# Patient Record
Sex: FEMALE | Race: BLACK OR AFRICAN AMERICAN | Employment: UNEMPLOYED | URBAN - NONMETROPOLITAN AREA
[De-identification: names, ages, dates, MRNs, and addresses within clinical notes are randomized per-mention and may not be internally consistent; named-entity substitution may affect disease eponyms.]

---

## 2018-03-25 ENCOUNTER — HOSPITAL ENCOUNTER (EMERGENCY)
Age: 28
Discharge: HOME | End: 2018-03-26
Attending: EMERGENCY MEDICINE
Payer: MEDICAID

## 2018-03-25 ENCOUNTER — APPOINTMENT (OUTPATIENT)
Dept: GENERAL RADIOLOGY | Age: 28
End: 2018-03-25
Payer: MEDICAID

## 2018-03-25 DIAGNOSIS — H81.10 BENIGN PAROXYSMAL POSITIONAL VERTIGO, UNSPECIFIED LATERALITY: Primary | ICD-10-CM

## 2018-03-25 LAB
ALBUMIN SERPL-MCNC: 4 G/DL (ref 3.5–5.2)
ALP BLD-CCNC: 70 U/L (ref 35–104)
ALT SERPL-CCNC: 8 U/L (ref 5–33)
ANION GAP SERPL CALCULATED.3IONS-SCNC: 16 MMOL/L (ref 7–19)
AST SERPL-CCNC: 11 U/L (ref 5–32)
BASOPHILS ABSOLUTE: 0 K/UL (ref 0–0.2)
BASOPHILS RELATIVE PERCENT: 0.5 % (ref 0–1)
BILIRUB SERPL-MCNC: <0.2 MG/DL (ref 0.2–1.2)
BUN BLDV-MCNC: 11 MG/DL (ref 6–20)
CALCIUM SERPL-MCNC: 8.9 MG/DL (ref 8.6–10)
CHLORIDE BLD-SCNC: 100 MMOL/L (ref 98–111)
CO2: 23 MMOL/L (ref 22–29)
CREAT SERPL-MCNC: 0.7 MG/DL (ref 0.5–0.9)
EOSINOPHILS ABSOLUTE: 0.1 K/UL (ref 0–0.6)
EOSINOPHILS RELATIVE PERCENT: 0.7 % (ref 0–5)
GFR NON-AFRICAN AMERICAN: >60
GLUCOSE BLD-MCNC: 104 MG/DL (ref 74–109)
HCT VFR BLD CALC: 38.8 % (ref 37–47)
HEMOGLOBIN: 13 G/DL (ref 12–16)
LYMPHOCYTES ABSOLUTE: 3.6 K/UL (ref 1.1–4.5)
LYMPHOCYTES RELATIVE PERCENT: 40.9 % (ref 20–40)
MCH RBC QN AUTO: 29.3 PG (ref 27–31)
MCHC RBC AUTO-ENTMCNC: 33.5 G/DL (ref 33–37)
MCV RBC AUTO: 87.6 FL (ref 81–99)
MONOCYTES ABSOLUTE: 0.7 K/UL (ref 0–0.9)
MONOCYTES RELATIVE PERCENT: 8.3 % (ref 0–10)
NEUTROPHILS ABSOLUTE: 4.4 K/UL (ref 1.5–7.5)
NEUTROPHILS RELATIVE PERCENT: 49.3 % (ref 50–65)
PDW BLD-RTO: 12.9 % (ref 11.5–14.5)
PLATELET # BLD: 282 K/UL (ref 130–400)
PMV BLD AUTO: 10.6 FL (ref 9.4–12.3)
POTASSIUM SERPL-SCNC: 3.6 MMOL/L (ref 3.5–5)
RBC # BLD: 4.43 M/UL (ref 4.2–5.4)
SODIUM BLD-SCNC: 139 MMOL/L (ref 136–145)
TOTAL PROTEIN: 7 G/DL (ref 6.6–8.7)
WBC # BLD: 8.9 K/UL (ref 4.8–10.8)

## 2018-03-25 PROCEDURE — 6370000000 HC RX 637 (ALT 250 FOR IP): Performed by: EMERGENCY MEDICINE

## 2018-03-25 PROCEDURE — 99284 EMERGENCY DEPT VISIT MOD MDM: CPT

## 2018-03-25 PROCEDURE — 36415 COLL VENOUS BLD VENIPUNCTURE: CPT

## 2018-03-25 PROCEDURE — 71046 X-RAY EXAM CHEST 2 VIEWS: CPT

## 2018-03-25 PROCEDURE — 85025 COMPLETE CBC W/AUTO DIFF WBC: CPT

## 2018-03-25 PROCEDURE — 93005 ELECTROCARDIOGRAM TRACING: CPT

## 2018-03-25 PROCEDURE — 80053 COMPREHEN METABOLIC PANEL: CPT

## 2018-03-25 RX ORDER — MECLIZINE HCL 12.5 MG/1
25 TABLET ORAL ONCE
Status: COMPLETED | OUTPATIENT
Start: 2018-03-25 | End: 2018-03-25

## 2018-03-25 RX ADMIN — MECLIZINE 25 MG: 12.5 TABLET ORAL at 22:10

## 2018-03-26 VITALS
HEIGHT: 63 IN | DIASTOLIC BLOOD PRESSURE: 79 MMHG | WEIGHT: 126 LBS | BODY MASS INDEX: 22.32 KG/M2 | OXYGEN SATURATION: 98 % | TEMPERATURE: 98.9 F | RESPIRATION RATE: 18 BRPM | HEART RATE: 80 BPM | SYSTOLIC BLOOD PRESSURE: 105 MMHG

## 2018-03-26 PROCEDURE — 99284 EMERGENCY DEPT VISIT MOD MDM: CPT | Performed by: EMERGENCY MEDICINE

## 2018-03-26 RX ORDER — MECLIZINE HYDROCHLORIDE 25 MG/1
25 TABLET ORAL 3 TIMES DAILY PRN
Qty: 15 TABLET | Refills: 0 | Status: SHIPPED | OUTPATIENT
Start: 2018-03-26 | End: 2018-04-05

## 2018-03-26 NOTE — ED PROVIDER NOTES
Smokeless tobacco: Never Used    Alcohol use No    Drug use: No    Sexual activity: Not Asked     Other Topics Concern    None     Social History Narrative    None       SCREENINGS    Beckie Coma Scale  Eye Opening: Spontaneous  Best Verbal Response: Oriented  Best Motor Response: Obeys commands  Beckie Coma Scale Score: 15        PHYSICAL EXAM    (up to 7 for level 4, 8 or more for level 5)     ED Triage Vitals [03/25/18 1942]   BP Temp Temp Source Pulse Resp SpO2 Height Weight   122/87 99 °F (37.2 °C) Temporal 90 18 99 % 5' 3\" (1.6 m) 126 lb (57.2 kg)       Physical Exam   Constitutional: She is oriented to person, place, and time. She appears well-developed and well-nourished. No distress. HENT:   Head: Normocephalic and atraumatic. Mouth/Throat: Oropharynx is clear and moist.   Eyes: Conjunctivae and EOM are normal. Pupils are equal, round, and reactive to light. Neck: Normal range of motion. Neck supple. No JVD present. Cardiovascular: Normal rate, regular rhythm and normal heart sounds. Exam reveals no gallop. No murmur heard. Pulmonary/Chest: Effort normal and breath sounds normal. She has no wheezes. She has no rales. Abdominal: Soft. Bowel sounds are normal. She exhibits no distension. There is no tenderness. There is no rebound and no guarding. Musculoskeletal: Normal range of motion. She exhibits no edema. Neurological: She is alert and oriented to person, place, and time. She has normal strength. She is not disoriented. No cranial nerve deficit or sensory deficit. Coordination and gait normal. GCS eye subscore is 4. GCS verbal subscore is 5. GCS motor subscore is 6. Pt will lose her balance with Romberg test.  Patient also has a positive Anthony-Hallpike without fatigue while lying flat. Skin: Skin is warm and dry. No rash noted. Psychiatric: She has a normal mood and affect.  Her behavior is normal. Judgment and thought content normal.   Nursing note and vitals Progress  Patient progress: stable      Reassessment      CONSULTS:  None    PROCEDURES:  Unless otherwise noted below, none     Procedures    FINAL IMPRESSION      1.  Benign paroxysmal positional vertigo, unspecified laterality          DISPOSITION/PLAN   DISPOSITION Decision To Discharge 03/26/2018 02:01:54 AM      PATIENT REFERRED TO:  Steven Arellano MD  [address] [place] [address] Dr Miguel Angel Flannery [address] [facility] [phone]    In 1 week  If symptoms worsen      DISCHARGE MEDICATIONS:  New Prescriptions    MECLIZINE (ANTIVERT) 25 MG TABLET    Take 1 tablet by mouth 3 times daily as needed for Dizziness          (Please note that portions of this note were completed with a voice recognition program.  Efforts were made to edit the dictations but occasionally words are mis-transcribed.)    Omid Quinones MD (electronically signed)  Attending Emergency Physician          Maria Elena Boogie MD  03/26/18 4738

## 2018-03-29 ENCOUNTER — TELEPHONE (OUTPATIENT)
Dept: OTOLARYNGOLOGY | Age: 28
End: 2018-03-29

## 2018-04-04 LAB
EKG P AXIS: 50 DEGREES
EKG P-R INTERVAL: 152 MS
EKG Q-T INTERVAL: 364 MS
EKG QRS DURATION: 68 MS
EKG QTC CALCULATION (BAZETT): 381 MS
EKG T AXIS: 12 DEGREES

## 2024-12-28 ENCOUNTER — HOSPITAL ENCOUNTER (INPATIENT)
Age: 34
LOS: 1 days | Discharge: ANOTHER HOSPITAL | DRG: 881 | End: 2024-12-28
Attending: PEDIATRICS | Admitting: PSYCHIATRY & NEUROLOGY

## 2024-12-28 ENCOUNTER — HOSPITAL ENCOUNTER (OUTPATIENT)
Age: 34
Setting detail: OBSERVATION
Discharge: HOME | End: 2024-12-29
Attending: FAMILY MEDICINE | Admitting: FAMILY MEDICINE

## 2024-12-28 VITALS
RESPIRATION RATE: 16 BRPM | BODY MASS INDEX: 39.82 KG/M2 | DIASTOLIC BLOOD PRESSURE: 84 MMHG | SYSTOLIC BLOOD PRESSURE: 115 MMHG | TEMPERATURE: 98.2 F | WEIGHT: 184.6 LBS | HEIGHT: 57 IN | OXYGEN SATURATION: 100 % | HEART RATE: 83 BPM

## 2024-12-28 DIAGNOSIS — R45.851 SUICIDAL IDEATION: Primary | ICD-10-CM

## 2024-12-28 PROBLEM — F32.A DEPRESSION WITH SUICIDAL IDEATION: Status: ACTIVE | Noted: 2024-12-28

## 2024-12-28 LAB
ALBUMIN SERPL-MCNC: 4 G/DL (ref 3.5–5.2)
ALP SERPL-CCNC: 69 U/L (ref 35–104)
ALT SERPL-CCNC: 11 U/L (ref 5–33)
AMPHET UR QL SCN: NEGATIVE
ANION GAP SERPL CALCULATED.3IONS-SCNC: 13 MMOL/L (ref 7–19)
ANION GAP SERPL CALCULATED.3IONS-SCNC: 14 MMOL/L (ref 7–19)
ANION GAP SERPL CALCULATED.3IONS-SCNC: 16 MMOL/L (ref 7–19)
AST SERPL-CCNC: 13 U/L (ref 5–32)
BARBITURATES UR QL SCN: NEGATIVE
BASOPHILS # BLD: 0 K/UL (ref 0–0.2)
BASOPHILS NFR BLD: 0.5 % (ref 0–1)
BENZODIAZ UR QL SCN: NEGATIVE
BILIRUB SERPL-MCNC: <0.2 MG/DL (ref 0.2–1.2)
BUN SERPL-MCNC: 11 MG/DL (ref 6–20)
BUN SERPL-MCNC: 7 MG/DL (ref 6–20)
BUN SERPL-MCNC: 8 MG/DL (ref 6–20)
BUPRENORPHINE URINE: NEGATIVE
CALCIUM SERPL-MCNC: 8.2 MG/DL (ref 8.6–10)
CALCIUM SERPL-MCNC: 8.4 MG/DL (ref 8.6–10)
CALCIUM SERPL-MCNC: 8.5 MG/DL (ref 8.6–10)
CANNABINOIDS UR QL SCN: NEGATIVE
CHLORIDE SERPL-SCNC: 100 MMOL/L (ref 98–111)
CHLORIDE SERPL-SCNC: 102 MMOL/L (ref 98–111)
CHLORIDE SERPL-SCNC: 102 MMOL/L (ref 98–111)
CO2 SERPL-SCNC: 24 MMOL/L (ref 22–29)
CO2 SERPL-SCNC: 27 MMOL/L (ref 22–29)
CO2 SERPL-SCNC: 28 MMOL/L (ref 22–29)
COCAINE UR QL SCN: NEGATIVE
CREAT SERPL-MCNC: 0.6 MG/DL (ref 0.5–0.9)
CREAT SERPL-MCNC: 0.7 MG/DL (ref 0.5–0.9)
CREAT SERPL-MCNC: 0.9 MG/DL (ref 0.5–0.9)
DRUG SCREEN COMMENT UR-IMP: NORMAL
EKG P AXIS: 60 DEGREES
EKG P-R INTERVAL: 156 MS
EKG Q-T INTERVAL: 352 MS
EKG QRS DURATION: 70 MS
EKG QTC CALCULATION (BAZETT): 402 MS
EKG T AXIS: 0 DEGREES
EOSINOPHIL # BLD: 0 K/UL (ref 0–0.6)
EOSINOPHIL NFR BLD: 0.4 % (ref 0–5)
ERYTHROCYTE [DISTWIDTH] IN BLOOD BY AUTOMATED COUNT: 13.1 % (ref 11.5–14.5)
ETHANOLAMINE SERPL-MCNC: <10 MG/DL (ref 0–0.08)
FENTANYL SCREEN, URINE: NEGATIVE
GLUCOSE SERPL-MCNC: 101 MG/DL (ref 70–99)
GLUCOSE SERPL-MCNC: 125 MG/DL (ref 70–99)
GLUCOSE SERPL-MCNC: 91 MG/DL (ref 70–99)
HCG SERPL QL: NEGATIVE
HCT VFR BLD AUTO: 34.8 % (ref 37–47)
HGB BLD-MCNC: 11.7 G/DL (ref 12–16)
IMM GRANULOCYTES # BLD: 0 K/UL
LYMPHOCYTES # BLD: 2.9 K/UL (ref 1.1–4.5)
LYMPHOCYTES NFR BLD: 33.5 % (ref 20–40)
MAGNESIUM SERPL-MCNC: 0.7 MG/DL (ref 1.6–2.6)
MAGNESIUM SERPL-MCNC: 0.7 MG/DL (ref 1.6–2.6)
MCH RBC QN AUTO: 28.8 PG (ref 27–31)
MCHC RBC AUTO-ENTMCNC: 33.6 G/DL (ref 33–37)
MCV RBC AUTO: 85.7 FL (ref 81–99)
METHADONE UR QL SCN: NEGATIVE
METHAMPHETAMINE, URINE: NEGATIVE
MONOCYTES # BLD: 0.8 K/UL (ref 0–0.9)
MONOCYTES NFR BLD: 9.9 % (ref 0–10)
NEUTROPHILS # BLD: 4.8 K/UL (ref 1.5–7.5)
NEUTS SEG NFR BLD: 55.6 % (ref 50–65)
OPIATES UR QL SCN: NEGATIVE
OXYCODONE UR QL SCN: NEGATIVE
PCP UR QL SCN: NEGATIVE
PLATELET # BLD AUTO: 312 K/UL (ref 130–400)
PMV BLD AUTO: 10.4 FL (ref 9.4–12.3)
POTASSIUM SERPL-SCNC: 2.9 MMOL/L (ref 3.5–5)
POTASSIUM SERPL-SCNC: 3 MMOL/L (ref 3.5–5)
POTASSIUM SERPL-SCNC: 3.8 MMOL/L (ref 3.5–5)
PROT SERPL-MCNC: 7 G/DL (ref 6.4–8.3)
RBC # BLD AUTO: 4.06 M/UL (ref 4.2–5.4)
SARS-COV-2 RDRP RESP QL NAA+PROBE: NOT DETECTED
SODIUM SERPL-SCNC: 140 MMOL/L (ref 136–145)
SODIUM SERPL-SCNC: 143 MMOL/L (ref 136–145)
SODIUM SERPL-SCNC: 143 MMOL/L (ref 136–145)
TRICYCLIC ANTIDEPRESSANTS, UR: NEGATIVE
WBC # BLD AUTO: 8.5 K/UL (ref 4.8–10.8)

## 2024-12-28 PROCEDURE — 80053 COMPREHEN METABOLIC PANEL: CPT

## 2024-12-28 PROCEDURE — 84703 CHORIONIC GONADOTROPIN ASSAY: CPT

## 2024-12-28 PROCEDURE — 83735 ASSAY OF MAGNESIUM: CPT

## 2024-12-28 PROCEDURE — 82077 ASSAY SPEC XCP UR&BREATH IA: CPT

## 2024-12-28 PROCEDURE — 36415 COLL VENOUS BLD VENIPUNCTURE: CPT

## 2024-12-28 PROCEDURE — 80307 DRUG TEST PRSMV CHEM ANLYZR: CPT

## 2024-12-28 PROCEDURE — 6370000000 HC RX 637 (ALT 250 FOR IP)

## 2024-12-28 PROCEDURE — 96372 THER/PROPH/DIAG INJ SC/IM: CPT

## 2024-12-28 PROCEDURE — 5130000000 HC BRIDGE APPOINTMENT

## 2024-12-28 PROCEDURE — 85025 COMPLETE CBC W/AUTO DIFF WBC: CPT

## 2024-12-28 PROCEDURE — 1200000000 HC SEMI PRIVATE

## 2024-12-28 PROCEDURE — 6370000000 HC RX 637 (ALT 250 FOR IP): Performed by: PEDIATRICS

## 2024-12-28 PROCEDURE — 1240000000 HC EMOTIONAL WELLNESS R&B

## 2024-12-28 PROCEDURE — 87635 SARS-COV-2 COVID-19 AMP PRB: CPT

## 2024-12-28 PROCEDURE — 96365 THER/PROPH/DIAG IV INF INIT: CPT

## 2024-12-28 PROCEDURE — 6360000002 HC RX W HCPCS: Performed by: PEDIATRICS

## 2024-12-28 PROCEDURE — 99223 1ST HOSP IP/OBS HIGH 75: CPT | Performed by: PSYCHIATRY & NEUROLOGY

## 2024-12-28 PROCEDURE — 99284 EMERGENCY DEPT VISIT MOD MDM: CPT

## 2024-12-28 PROCEDURE — 96366 THER/PROPH/DIAG IV INF ADDON: CPT

## 2024-12-28 PROCEDURE — 6360000002 HC RX W HCPCS: Performed by: FAMILY MEDICINE

## 2024-12-28 PROCEDURE — G0480 DRUG TEST DEF 1-7 CLASSES: HCPCS

## 2024-12-28 RX ORDER — NICOTINE 21 MG/24HR
1 PATCH, TRANSDERMAL 24 HOURS TRANSDERMAL DAILY
Status: DISCONTINUED | OUTPATIENT
Start: 2024-12-28 | End: 2024-12-28 | Stop reason: HOSPADM

## 2024-12-28 RX ORDER — HYDROXYZINE HYDROCHLORIDE 25 MG/1
25 TABLET, FILM COATED ORAL 3 TIMES DAILY PRN
Status: DISCONTINUED | OUTPATIENT
Start: 2024-12-28 | End: 2024-12-28 | Stop reason: HOSPADM

## 2024-12-28 RX ORDER — HYDROXYZINE HYDROCHLORIDE 25 MG/1
25 TABLET, FILM COATED ORAL 3 TIMES DAILY PRN
Qty: 90 TABLET | Refills: 0 | Status: SHIPPED | OUTPATIENT
Start: 2024-12-28

## 2024-12-28 RX ORDER — MAGNESIUM SULFATE IN WATER 40 MG/ML
2000 INJECTION, SOLUTION INTRAVENOUS PRN
Status: DISCONTINUED | OUTPATIENT
Start: 2024-12-28 | End: 2024-12-29 | Stop reason: HOSPADM

## 2024-12-28 RX ORDER — LANOLIN ALCOHOL/MO/W.PET/CERES
400 CREAM (GRAM) TOPICAL DAILY
Qty: 30 TABLET | Refills: 0 | Status: SHIPPED | OUTPATIENT
Start: 2024-12-28

## 2024-12-28 RX ORDER — LANOLIN ALCOHOL/MO/W.PET/CERES
400 CREAM (GRAM) TOPICAL DAILY
Status: DISCONTINUED | OUTPATIENT
Start: 2024-12-28 | End: 2024-12-28 | Stop reason: HOSPADM

## 2024-12-28 RX ORDER — MECOBALAMIN 5000 MCG
5 TABLET,DISINTEGRATING ORAL
Status: DISCONTINUED | OUTPATIENT
Start: 2024-12-28 | End: 2024-12-28 | Stop reason: HOSPADM

## 2024-12-28 RX ORDER — MECOBALAMIN 5000 MCG
5 TABLET,DISINTEGRATING ORAL
Qty: 30 TABLET | Refills: 0 | Status: SHIPPED | OUTPATIENT
Start: 2024-12-28

## 2024-12-28 RX ORDER — POLYETHYLENE GLYCOL 3350 17 G
2 POWDER IN PACKET (EA) ORAL
Status: DISCONTINUED | OUTPATIENT
Start: 2024-12-28 | End: 2024-12-28 | Stop reason: HOSPADM

## 2024-12-28 RX ORDER — ACETAMINOPHEN 325 MG/1
650 TABLET ORAL EVERY 4 HOURS PRN
Status: DISCONTINUED | OUTPATIENT
Start: 2024-12-28 | End: 2024-12-28 | Stop reason: HOSPADM

## 2024-12-28 RX ORDER — TRAZODONE HYDROCHLORIDE 50 MG/1
50 TABLET, FILM COATED ORAL NIGHTLY PRN
Qty: 30 TABLET | Refills: 0 | Status: SHIPPED | OUTPATIENT
Start: 2024-12-28

## 2024-12-28 RX ORDER — POLYETHYLENE GLYCOL 3350 17 G/17G
17 POWDER, FOR SOLUTION ORAL DAILY PRN
Status: DISCONTINUED | OUTPATIENT
Start: 2024-12-28 | End: 2024-12-28 | Stop reason: HOSPADM

## 2024-12-28 RX ORDER — LORAZEPAM 2 MG/ML
2 INJECTION INTRAMUSCULAR ONCE
Status: COMPLETED | OUTPATIENT
Start: 2024-12-28 | End: 2024-12-28

## 2024-12-28 RX ORDER — TRAZODONE HYDROCHLORIDE 50 MG/1
50 TABLET, FILM COATED ORAL NIGHTLY PRN
Status: DISCONTINUED | OUTPATIENT
Start: 2024-12-28 | End: 2024-12-28 | Stop reason: HOSPADM

## 2024-12-28 RX ORDER — HALOPERIDOL 5 MG/ML
5 INJECTION INTRAMUSCULAR ONCE
Status: COMPLETED | OUTPATIENT
Start: 2024-12-28 | End: 2024-12-28

## 2024-12-28 RX ORDER — POTASSIUM CHLORIDE 1500 MG/1
40 TABLET, EXTENDED RELEASE ORAL PRN
Status: DISCONTINUED | OUTPATIENT
Start: 2024-12-28 | End: 2024-12-29 | Stop reason: HOSPADM

## 2024-12-28 RX ORDER — POTASSIUM CHLORIDE 7.45 MG/ML
10 INJECTION INTRAVENOUS PRN
Status: DISCONTINUED | OUTPATIENT
Start: 2024-12-28 | End: 2024-12-29 | Stop reason: HOSPADM

## 2024-12-28 RX ADMIN — Medication 400 MG: at 11:50

## 2024-12-28 RX ADMIN — LORAZEPAM 2 MG: 2 INJECTION INTRAMUSCULAR; INTRAVENOUS at 03:47

## 2024-12-28 RX ADMIN — POTASSIUM BICARBONATE 40 MEQ: 782 TABLET, EFFERVESCENT ORAL at 11:50

## 2024-12-28 RX ADMIN — HALOPERIDOL LACTATE 5 MG: 5 INJECTION, SOLUTION INTRAMUSCULAR at 03:47

## 2024-12-28 RX ADMIN — MAGNESIUM SULFATE HEPTAHYDRATE 2000 MG: 40 INJECTION, SOLUTION INTRAVENOUS at 17:08

## 2024-12-28 RX ADMIN — POTASSIUM BICARBONATE 40 MEQ: 782 TABLET, EFFERVESCENT ORAL at 02:31

## 2024-12-28 RX ADMIN — MAGNESIUM SULFATE HEPTAHYDRATE 2000 MG: 40 INJECTION, SOLUTION INTRAVENOUS at 15:38

## 2024-12-28 SDOH — HEALTH STABILITY: PHYSICAL HEALTH: ON AVERAGE, HOW MANY MINUTES DO YOU ENGAGE IN EXERCISE AT THIS LEVEL?: 0 MIN

## 2024-12-28 SDOH — HEALTH STABILITY: PHYSICAL HEALTH: ON AVERAGE, HOW MANY DAYS PER WEEK DO YOU ENGAGE IN MODERATE TO STRENUOUS EXERCISE (LIKE A BRISK WALK)?: 0 DAYS

## 2024-12-28 ASSESSMENT — PATIENT HEALTH QUESTIONNAIRE - PHQ9
SUM OF ALL RESPONSES TO PHQ9 QUESTIONS 1 & 2: 2
SUM OF ALL RESPONSES TO PHQ QUESTIONS 1-9: 2
1. LITTLE INTEREST OR PLEASURE IN DOING THINGS: SEVERAL DAYS
2. FEELING DOWN, DEPRESSED OR HOPELESS: SEVERAL DAYS

## 2024-12-28 ASSESSMENT — PAIN - FUNCTIONAL ASSESSMENT: PAIN_FUNCTIONAL_ASSESSMENT: NONE - DENIES PAIN

## 2024-12-28 ASSESSMENT — SLEEP AND FATIGUE QUESTIONNAIRES
DO YOU HAVE DIFFICULTY SLEEPING: YES
DO YOU USE A SLEEP AID: NO
SLEEP PATTERN: DIFFICULTY FALLING ASLEEP
AVERAGE NUMBER OF SLEEP HOURS: 6

## 2024-12-28 ASSESSMENT — SOCIAL DETERMINANTS OF HEALTH (SDOH)
HOW OFTEN DO YOU ATTEND CHURCH OR RELIGIOUS SERVICES?: NEVER
HOW HARD IS IT FOR YOU TO PAY FOR THE VERY BASICS LIKE FOOD, HOUSING, MEDICAL CARE, AND HEATING?: VERY HARD
HOW OFTEN DO YOU ATTENT MEETINGS OF THE CLUB OR ORGANIZATION YOU BELONG TO?: NEVER
WITHIN THE LAST YEAR, HAVE YOU BEEN KICKED, HIT, SLAPPED, OR OTHERWISE PHYSICALLY HURT BY YOUR PARTNER OR EX-PARTNER?: NO
WITHIN THE LAST YEAR, HAVE YOU BEEN HUMILIATED OR EMOTIONALLY ABUSED IN OTHER WAYS BY YOUR PARTNER OR EX-PARTNER?: NO
WITHIN THE LAST YEAR, HAVE YOU BEEN AFRAID OF YOUR PARTNER OR EX-PARTNER?: NO
IN A TYPICAL WEEK, HOW MANY TIMES DO YOU TALK ON THE PHONE WITH FAMILY, FRIENDS, OR NEIGHBORS?: NEVER
DO YOU BELONG TO ANY CLUBS OR ORGANIZATIONS SUCH AS CHURCH GROUPS UNIONS, FRATERNAL OR ATHLETIC GROUPS, OR SCHOOL GROUPS?: NO
HOW OFTEN DO YOU GET TOGETHER WITH FRIENDS OR RELATIVES?: NEVER
WITHIN THE LAST YEAR, HAVE TO BEEN RAPED OR FORCED TO HAVE ANY KIND OF SEXUAL ACTIVITY BY YOUR PARTNER OR EX-PARTNER?: NO

## 2024-12-28 ASSESSMENT — LIFESTYLE VARIABLES
HOW MANY STANDARD DRINKS CONTAINING ALCOHOL DO YOU HAVE ON A TYPICAL DAY: PATIENT DOES NOT DRINK
HOW OFTEN DO YOU HAVE A DRINK CONTAINING ALCOHOL: NEVER
HOW MANY STANDARD DRINKS CONTAINING ALCOHOL DO YOU HAVE ON A TYPICAL DAY: PATIENT DOES NOT DRINK
HOW OFTEN DO YOU HAVE A DRINK CONTAINING ALCOHOL: NEVER

## 2024-12-28 NOTE — ED TRIAGE NOTES
Pt brought in by EMS. EMS reports that pt sent a message to a friend stating that she was Suicidal. Pts friend contacted EMS for assistance. Pt at this time not discussing situation.

## 2024-12-28 NOTE — PROGRESS NOTES
[facility] ADULT INITIAL INTAKE ASSESSMENT     24     Karen Duque, a 33 yo female presents to the ED for a psychiatric assessment.      ED Arrival time: 30  ED physician: Reji  [facility] Notification time: 220  [facility] Assessment start time: 230  Psychiatrist call time: 300  Spoke with Dr. Gruber     Patient is referred by: ambulance    Reason for visit to ED - Presenting problem:     PT states reason for ED visit, \"I was going through an emotional episode.  I got a lot going on.  They told me that I could come here and get a counselor and do outpatient.  The police told me that.  They told me that I wouldn't be there very long.  I think that is what I needed.  Someone to talk to.  I don't have nobody to talk to now.  The counselor will help me to get my feelings and thoughts out.  They will give me advice to fix it and stop it.  That's what I need.  I am not a medicine person.  Just someone to talk to.  My mom , my daughter , and my granny .  All in the last few years.  I have no support.  That's where a counselor will help me with my problems.  I took that picture of the knife to my throat.  I didn't mean to send it.  I was just having an emotional break.  Sometimes, I take pictures and then delete them.  I don't intend to harm myself.  If I was, it took the ambulance so long to get there, I would already be gone.  I am not going to harm myself.  I am currently going through a divorce and he is staying in a hotel.  I am trying to find a job so I can better myself.\"  Patient denies SI, HI, and AVH at this time.    ED physician reports:  Karen Duque is a 34 y.o. female who presents to the emergency department with suicidal ideation.  Patient refuses to give history at this time.  EMS states that patient took a picture of herself holding a knife to her throat and sent it to a friend.  We are currently awaiting arrival of police as patient was sent to the emergency department on a .  Patient states

## 2024-12-28 NOTE — H&P
[facility]      Hospitalist - History & Physical      PCP: No primary care provider on file.    Date of Admission: 12/28/2024    Date of Service: 12/28/2024    Chief Complaint:  SI    History Of Present Illness:   This patient is a 34 y.o. female presenting to [facility] ED on 12/28 for evaluation of SI, cleared medically in ED and admitted to psychiatry.  Found to have significant hypokalemia and hypomagnesemia.  Plan to transfer to medical floor for electrolyte repletion and telemetry monitoring.    Past Medical History:    History reviewed. No pertinent past medical history.    Past Surgical History:        Procedure Laterality Date    DILATION AND CURETTAGE OF UTERUS         Home Medications:  Prior to Admission medications    Medication Sig Start Date End Date Taking? Authorizing Provider   hydrOXYzine HCl (ATARAX) 25 MG tablet Take 1 tablet by mouth 3 times daily as needed for Anxiety 12/28/24  Yes Jessee Gruber MD   traZODone (DESYREL) 50 MG tablet Take 1 tablet by mouth nightly as needed for Sleep 12/28/24  Yes Jessee Gruber MD   melatonin 5 MG TBDP disintegrating tablet Take 1 tablet by mouth nightly as needed (sleep) 12/28/24  Yes Jessee Gruber MD   magnesium oxide (MAG-OX) 400 (240 Mg) MG tablet Take 1 tablet by mouth daily 12/28/24  Yes Jessee Gruber MD   potassium bicarb-citric acid (EFFER-K) 20 MEQ TBEF effervescent tablet Take 2 tablets by mouth every 6 hours for 2 doses 12/28/24 12/29/24 Yes Jessee Gruber MD       Allergies:    Patient has no known allergies.    Social History:    Tobacco:   reports that she has never smoked. She has never used smokeless tobacco.  Alcohol:   reports no history of alcohol use.  Illicit Drugs: denies    Family History:  History reviewed. No pertinent family history.    Review of Systems     Objective   Physical Examination:  /84   Pulse 83   Temp 98.2 °F (36.8 °C) (Tympanic)   Resp 16   Ht 1.448 m (4' 9\")   Wt 83.7 kg (184 lb 9.6 oz)   LMP  12/21/2024   SpO2 100%   BMI 39.95 kg/m²     Physical Exam     Labs and Diagnostics   CBC:  Recent Labs     12/28/24  0103   WBC 8.5   HGB 11.7*   HCT 34.8*          BMP:  Recent Labs     12/28/24 0103 12/28/24  0918    143   K 2.9* 3.0*    102   CO2 24 27   BUN 11 8   CREATININE 0.9 0.6   CALCIUM 8.5* 8.4*     Recent Labs     12/28/24 0103   AST 13   ALT 11   BILITOT <0.2   ALKPHOS 69       Coag Panel: No results for input(s): \"INR\", \"PROTIME\", \"APTT\" in the last 72 hours.    Cardiac Enzymes: No results for input(s): \"CKTOTAL\", \"TROPONINI\" in the last 72 hours.    ABGs:No results found for: \"PHART\", \"PO2ART\", \"RIG2VIW\"    Urinalysis:  Lab Results   Component Value Date/Time    NITRU NEGATIVE 07/12/2015 05:11 AM    WBCUA 4 07/12/2015 05:11 AM    BACTERIA NEGATIVE 07/12/2015 05:11 AM    RBCUA 0 07/12/2015 05:11 AM    GLUCOSEU NEGATIVE 07/12/2015 05:11 AM       A1C: No results for input(s): \"LABA1C\" in the last 72 hours.    ABG:No results for input(s): \"PHART\", \"BUH4GPM\", \"PO2ART\", \"TZM8LOU\", \"BEART\", \"HGBAE\", \"C3MVHSMZ\", \"CARBOXHGBART\" in the last 72 hours.    Rad: No results found.    Assessment/Plan:   Depression with suicidal ideation  -Per psych    Hypokalemia  Hypomagnesemia  -Replace per protocol  -GI panel due to reported diarrhea    Discharge planning: TBD     Advance Directive: Full Code    Diet: ADULT DIET; Regular     Consults Made:   IP CONSULT TO HOSPITALIST    Further orders per clinical course/attending.     EMR Dragon/Transcription disclaimer:   Much of this encounter note is an electronic transcription/translation of spoken language to printed text. The electronic translation of spoken language may permit erroneous words or phrases to be inadvertently transcribed; although attempts have made to review the note for such errors, some may still exist.

## 2024-12-28 NOTE — ED PROVIDER NOTES
[facility] EMERGENCY DEPT  eMERGENCY dEPARTMENT eNCOUnter      Pt Name: Karen Duque  MRN: [id number]  Birthdate 1990  Date of evaluation: 12/28/2024  Provider: Paula Mendoza MD    CHIEF COMPLAINT       Chief Complaint   Patient presents with    Mental Health Problem         HISTORY OF PRESENT ILLNESS   (Location/Symptom, Timing/Onset,Context/Setting, Quality, Duration, Modifying Factors, Severity)  Note limiting factors.   Karen Duque is a 34 y.o. female who presents to the emergency department with suicidal ideation.  Patient refuses to give history at this time.  EMS states that patient took a picture of herself holding a knife to her throat and sent it to a friend.  We are currently awaiting arrival of police as patient was sent to the emergency department on a 202A.  Patient states \"I have got ago.  Got a cat to take care of.\"  Patient does not answer questions at this time but continues to repeat \"I've got to go.\"  Patient does admit to having a recent divorce but states \"that is okay.\"    HPI    NursingNotes were reviewed.    REVIEW OF SYSTEMS    (2-9 systems for level 4, 10 or more for level 5)     Review of Systems   Unable to perform ROS: Psychiatric disorder (Patient uncooperative at this time)   Psychiatric/Behavioral:  Positive for suicidal ideas (Per EMS/police).             PAST MEDICALHISTORY   History reviewed. No pertinent past medical history.      SURGICAL HISTORY       Past Surgical History:   Procedure Laterality Date    DILATION AND CURETTAGE OF UTERUS           CURRENT MEDICATIONS     Previous Medications    No medications on file       ALLERGIES     Patient has no known allergies.    FAMILY HISTORY     History reviewed. No pertinent family history.       SOCIAL HISTORY       Social History     Socioeconomic History    Marital status: Single     Spouse name: None    Number of children: None    Years of education: None    Highest education level: None   Tobacco Use    Smoking status:  components within normal limits   COMPREHENSIVE METABOLIC PANEL - Abnormal; Notable for the following components:    Potassium 2.9 (*)     Glucose 125 (*)     Calcium 8.5 (*)     All other components within normal limits   COVID-19, RAPID   ETHANOL   DRUG SCRN, BUPRENORPHINE   HCG, SERUM, QUALITATIVE       All other labs were within normal range or not returned as of this dictation.    EMERGENCY DEPARTMENT COURSE and DIFFERENTIAL DIAGNOSIS/MDM:   Vitals:    Vitals:    12/28/24 0030 12/28/24 0032   BP: 116/86 116/86   Pulse: 69    Temp: 99 °F (37.2 °C)    TempSrc: Oral    SpO2: 96% 95%   Weight:  77.1 kg (170 lb)   Height:  1.448 m (4' 9\")       MDM  34-year-old female presents to the emergency department after sending a picture to her friend of patient holding a knife to her own throat.  EMS was called by police department to transfer patient to the emergency department.  Patient is here on a 202 a.  Patient is uncooperative for history and complete physical at this time.  Patient is placed on a 72-hour hold while psychiatric evaluation is completed.  Lab results reviewed.  Patient is medically cleared for psychiatric evaluation and treatment.  [place] psychiatric PA-CHELSEY, evaluates patient at bedside.  Patient is more communicative with her.  Dr. Gruber, psychiatrist, will admit patient to behavioral health for further evaluation and treatment.        PROCEDURES:  Unless otherwise noted below, none     Procedures    FINAL IMPRESSION      1. Suicidal ideation          DISPOSITION/PLAN   DISPOSITION Decision To Admit 12/28/2024 03:25:18 AM   DISPOSITION CONDITION Stable       (Please note that portions of this note were completed with a voice recognition program.  Efforts were made to edit thedictations but occasionally words are mis-transcribed.)    Paula Mendoza MD (electronically signed)  Attending Emergency Physician          Paula Mendoza MD  12/28/24 0325       Paula Mendoza MD  01/01/25

## 2024-12-28 NOTE — PROGRESS NOTES
Behavioral Services  Medicare Certification Upon Admission    I certify that this patient's inpatient psychiatric hospital admission is medically necessary for:    [x] (1) Treatment which could reasonably be expected to improve this patient's condition,       [x] (2) Or for diagnostic study;     AND     [x](2) The inpatient psychiatric services are provided while the individual is under the care of a physician and are included in the individualized plan of care.    Estimated length of stay/service 3-5 days    Plan for post-hospital care TBD    Electronically signed by JORGE CROOKS MD on 12/28/2024 at 9:26 AM

## 2024-12-28 NOTE — H&P
Department of Psychiatry  History and Physical - Adult         CHIEF COMPLAINT: Suicidal ideations    History obtained from:  patient    HISTORY OF PRESENT ILLNESS:          The patient is a 34 y.o. female with no reported previous psychiatric history, who has been admitted to our psychiatric unit for mental health evaluation, due to safety concern, after patient sent her pictures to friend holding a knife to her throat.     For initial psychiatric evaluation, please, refer to the note of psychiatry [facility] nurse  Waleska Sethi RN, as reflected below:  \"PT states reason for ED visit, \"I was going through an emotional episode.  I got a lot going on.  They told me that I could come here and get a counselor and do outpatient.  The police told me that.  They told me that I wouldn't be there very long.  I think that is what I needed.  Someone to talk to.  I don't have nobody to talk to now.  The counselor will help me to get my feelings and thoughts out.  They will give me advice to fix it and stop it.  That's what I need.  I am not a medicine person.  Just someone to talk to.  My mom , my daughter , and my granny .  All in the last few years.  I have no support.  That's where a counselor will help me with my problems.  I took that picture of the knife to my throat.  I didn't mean to send it.  I was just having an emotional break.  Sometimes, I take pictures and then delete them.  I don't intend to harm myself.  If I was, it took the ambulance so long to get there, I would already be gone.  I am not going to harm myself.  I am currently going through a divorce and he is staying in a hotel.  I am trying to find a job so I can better myself.\"  Patient denies SI, HI, and AVH at this time.\"     Patient has been seen in treatment team room with presence of the patient's nurse.  However, patient stated that she does not know why she is in the hospital and requested to be discharged home today, said that she  breath.  Gastrointestinal: No abdominal pain, nausea or vomiting, no diarrhea or constipation.   Musculo-skeletal: No edema, deformities, or loss of functions.  Neurological: No loss of consciousness, abnormal sensations, or weakness.  Skin: No rash, abrasions or bruises.    PHYSICAL EXAM:    Vitals:  BP (!) 116/94   Pulse 99   Temp 98.3 °F (36.8 °C) (Temporal)   Resp 16   Ht 1.448 m (4' 9\")   Wt 83.7 kg (184 lb 9.6 oz)   LMP 12/21/2024   SpO2 99%   BMI 39.95 kg/m²       Mental Status Examination:    Appearance: Poorly groomed and in hospital attire. Made intermittent eye contact.   Behavior: Withdrawn, partially cooperative with interview, mild to moderate psychomotor retardation appreciated.  Gait unremarkable.   Speech: Normal in tone and quality, decrease in volume..   Mood: \"Fine\"   Affect: Mood congruent. Range is flat and restricted  Thought Process: Mostly circumstantial  Thought Content: Patient does not have any current active suicidal and homicidal ideations. No overt delusions or paranoia appreciated.   Perceptions: Seems patient does not have any auditory or visual hallucinations at present time. Patient did not appear to be responding to internal stimuli. No overt psychosis.   Executive Functions: Appear mildly impaired.   Concentration: Decreased  Reasoning: Appears impaired based on interaction from interview   Orientation: to person, place and situation.   Alert.   Language: Intact.   Fund of information: Intact.   Memory: recent and remote appear intact.   Impulsivity: Limited  Neurovegitative: Fair appetite, fair sleep  Insight: Limited  Judgment: Limited    Physical Exam:  GENERAL APPEARANCE: 34 y.o. female  in NAD   HEAD: Normocephalic, atraumatic.   THROAT: No erythema, exudates, lesions. No tongue laceration.   CARDIOVASCULAR: PMI nondisplaced. Regular rhythm and rate. Normal S1 and S2.  PULMONARY: Clear to auscultation bilaterally, no tenderness to palpation.  ABDOMEN: Soft, obese,

## 2024-12-28 NOTE — PROGRESS NOTES
Admission Note      Reason for admission/Target Symptom: Per nursing admission assessment - Reason for Admission: Karen Duque is a 34 y.o. female who presents  with depression.  She denies SI but  she took a picture of herself holding a knife to her throat and sent it to a friend.  Patient was sent to the emergency department by [place] [facility] on a 202A.  Patient states \"I have got ago.  Got a cat to take care of.\"  Patient is adament that she cannot stay.  She was put on an Emergency 72 hour hold in the  ED.  Patient denies SI, HI, and AVH.  She reports an admission to [facility] for psych as a minor but does not go to an outpatient for psychiatric care.  She takes no home medications.  She says that she wants a counselor because she has no one to talk to and if she did have a counselor, it would help her with her depression.  She says that she is going through a divorce and her  is staying in a hotel.  She reports that she is trying to get him to stay in the spare room at the house to save money but her  refused.  She also said that she would not allow him to come to feed her cat while she is here.  She said that she had no one that would do it and her cat will die before she can get home to it.    Diagnoses: Mood Disorder     UDS: Negative   BAL: Negative <10    SW will meet with treatment team to discuss patient's treatment including care planning, discharge planning, and follow-up needs. Patient has been admitted to [facility].     Treatment team will identify the patient's discharge needs. Appointments will be made for medication management and outpatient therapy/counseling. Pt confirmed the need for ongoing treatment post inpatient stay. Pt was also provided a handout of contact information for drug and alcohol treatment centers and other community support service such as [facility], AA, and [facility].

## 2024-12-28 NOTE — PROGRESS NOTES
Group Note    Date: 12/28/24  Start Time: 8:00 AM   End Time:8:30 AM     Number of Participants: 9    Type of Group: Community/Goal     Patient's Goal:      Notes:  Did not participate    Modes of Intervention: Education and Support    Discipline Responsible: Behavioral Health Technician     Signature:  STEPHANIE TIAN

## 2024-12-28 NOTE — DISCHARGE INSTR - LAB
[facility]  Mental Health Resources*    Crisis Resources  988 Suicide and Crisis Hotline  If you or someone you know needs support now, call or text 988 or chat Genius.com.Eye-Q    Suicide Prevention Lifeline  6-512-498-BVTW(0803)    Crisis Text Link  Text [place] to 297503    [facility]  Emergency Crisis Intervention Line   921.776.7265    Mental Health Providers     [facility]   [facility]   [address] [address]   www.childwatchca.org   [phone]   Trauma (Child/Adolescent/Teen)  Patients under 18 years old accepted   Free Service  [facility]   [address] [place] [address] [address]   [phone]   Individual, couple, family counseling, children's counseling, depression, anxiety etc.  Patients under 18 years old accepted   Accepts Medicaid, Medicare, most Commercial Insurances  [facility]   [address] [address]   https://compassIntransa/   [phone]   Patients under 18 years old accepted   Accepts Medicaid, Multiple Commercial   [facility]   [address] [place] [address]   [email or web address]   [phone]   Patients under 18 years old accepted    Accepts Most Commercial Insurances, [place] Medicaid, Medicare  [facility]  [email or web address]   Patients under 18 years old accepted    Accepts Medicaid, Medicare, Most Private Insurances  [facility]  [address]; [place]  [address]  [phone] option 6  [facility] [address] [facility]; [place]  [address]  [phone] option 5  [facility] - Information Age  [address]; [place] [address]  [phone] option 7  [facility]   [address] [place] [address]  [address] [facility]  them from finding success in the workforce.  [address] [place] [address]   [phone]  [email or web address]-[email or web address]     Other Assistance:  Low Income Home Energy Assistance Program (LIHEAP)  Federally-funded program to help eligible, low-income households meet their heating/cooling needs.   Website: https://www.[facility].ky.gov/agencies/dcbs/dfs/pdb/Pages/liheap.aspx  6.589.285.7690    [facility] [employer]  Serving single parent households, foster parents, and teens aging out of the foster care system. Provides clothing, home goods, and furniture to those who are most greatly in need of assistance.  [address]nd [address] [place], [place] [address]  [phone]    [facility]  Assistance with household items and clothing needs.   [address] [place], [place] [address]- at [employer] [employer]   Website: https://[employer]/acts  [phone]

## 2024-12-28 NOTE — PLAN OF CARE
Problem: Risk for Elopement  Goal: Patient will not exit the unit/facility without proper excort  Outcome: Progressing  Flowsheets (Taken 12/28/2024 [id number] by Waleska Sethi RN)  Nursing Interventions for Elopement Risk:   Assist with personal care needs such as toileting, eating, dressing, as needed to reduce the risk of wandering   Collaborate with treatment team for drug withdrawal symptoms treatment   Communicate/escalate to /other team member the risk of elopement   Escort with two staff members if patient must leave the unit   Shoes and clothing collected and placed in gown attire   Make sure patient has all necessary personal care items   Communicate/escalate to nursing supervisor the risk of elopement   Collaborate with treatment team for nicotine replacement   Collaborate with family members/caregivers to mitigate the elopement risk   Communicate/escalate to charge nurse the risk of elopement   Communicate to physician the risk for elopement   Place patient in room far away from exits and stairways   Reduce environmental triggers     Problem: Self Harm/Suicidality  Goal: Will have no self-injury during hospital stay  Description: INTERVENTIONS:  1.  Ensure constant observer at bedside with Q15M safety checks  2.  Maintain a safe environment  3.  Secure patient belongings  4.  Ensure family/visitors adhere to safety recommendations  5.  Ensure safety tray has been added to patient's diet order  6.  Every shift and PRN: Re-assess suicidal risk via Frequent Screener    Outcome: Progressing     Problem: Depression  Goal: Will be euthymic at discharge  Description: INTERVENTIONS:  1. Administer medication as ordered  2. Provide emotional support via 1:1 interaction with staff  3. Encourage involvement in milieu/groups/activities  4. Monitor for social isolation  Outcome: Progressing     Problem: Anxiety  Goal: Will report anxiety at manageable levels  Description: INTERVENTIONS:  1. Administer  medication as ordered  2. Teach and rehearse alternative coping skills  3. Provide emotional support with 1:1 interaction with staff  Outcome: Progressing     Problem: Sleep Disturbance  Goal: Will exhibit normal sleeping pattern  Description: INTERVENTIONS:  1. Administer medication as ordered  2. Decrease environmental stimuli, including noise, as appropriate  3. Discourage social isolation and naps during the day  Outcome: Progressing     Problem: Involuntary Admit  Goal: Will cooperate with staff recommendations and doctor's orders and will demonstrate appropriate behavior  Description: INTERVENTIONS:  1. Treat underlying conditions and offer medication as ordered  2. Educate regarding involuntary admission procedures and rules  3. Contain excessive/inappropriate behavior per unit and hospital policies  Outcome: Progressing

## 2024-12-28 NOTE — PROGRESS NOTES
Nursing Admission Note    Reason for Admission: Karen Duque is a 34 y.o. female who presents  with depression.  She denies SI but  she took a picture of herself holding a knife to her throat and sent it to a friend.  Patient was sent to the emergency department by [employer] on a 202A.  Patient states \"I have got ago.  Got a cat to take care of.\"  Patient is adament that she cannot stay.  She was put on an Emergency 72 hour hold in the  ED.  Patient denies SI, HI, and AVH.  She reports an admission to [facility] for psych as a minor but does not go to an outpatient for psychiatric care.  She takes no home medications.  She says that she wants a counselor because she has no one to talk to and if she did have a counselor, it would help her with her depression.  She says that she is going through a divorce and her  is staying in a hotel.  She reports that she is trying to get him to stay in the spare room at the house to save money but her  refused.  She also said that she would not allow him to come to feed her cat while she is here.  She said that she had no one that would do it and her cat will die before she can get home to it.    Additional Notes:  Patient arrived on the unit via wheelchair accompanied by an ED tech and Security.  Patient was appropriately dressed in paper scrubs.  Patient was calm and cooperative with assessment.  She was medicated in the ED and retired to her room after assessment was completed.    Patient Active Problem List   Diagnosis    Depression with suicidal ideation       C-SSRS:  C-SSRS Completed: yes.    Risk Assessment Completed: yes.    Risk of Suicide per C-SSRS: Risk of Suicide: No Risk  Provider Notified of the C-SSRS Screening and   Risk Assessment Findings: yes.    Order for Constant Observer Continued: no.    If no, discontinued due to the following reasons:  Patient is on 15 minute safety checks yes.  Safety Features on the unit: yes.    No previous    Patient signed all legal documents no   Pt verbalizes understanding:NA       Medical:  Order for Medical H&P placed? yes    Was medical provider notified? yes      Electronically signed by Waleska Sethi RN on 12/28/24 at 4:46 AM CST

## 2024-12-28 NOTE — ED NOTES
ED TO INPATIENT SBAR HANDOFF    Patient Name: Karen Duque   : 1990  34 y.o.   Family/Caregiver Present: No  Code Status Order: No Order    C-SSRS: Risk of Suicide: No Risk  Sitter Yes  Restraints:         Situation  Chief Complaint:   Chief Complaint   Patient presents with    Mental Health Problem     Patient Diagnosis: No admission diagnoses are documented for this encounter.     Brief Description of Patient's Condition: Karen Duque is a 34 y.o. female who presents to the emergency department with suicidal ideation.  Patient refuses to give history at this time.  EMS states that patient took a picture of herself holding a knife to her throat and sent it to a friend.  We are currently awaiting arrival of police as patient was sent to the emergency department on a .  Patient states \"I have got ago.  Got a cat to take care of.\"  Patient does not answer questions at this time but continues to repeat \"I've got to go.\"  Patient does admit to having a recent divorce but states \"that is okay.\"      Mental Status: oriented, alert, coherent, logical, thought processes intact, and able to concentrate and follow conversation  Arrived from: home    Imaging:   No orders to display     COVID-19 Results:   Internal Administration   First Dose      Second Dose           Last COVID Lab No results found for: \"SARS-COV-2\"        Abnormal labs:   Abnormal Labs Reviewed   CBC WITH AUTO DIFFERENTIAL - Abnormal; Notable for the following components:       Result Value    RBC 4.06 (*)     Hemoglobin 11.7 (*)     Hematocrit 34.8 (*)     All other components within normal limits   COMPREHENSIVE METABOLIC PANEL - Abnormal; Notable for the following components:    Potassium 2.9 (*)     Glucose 125 (*)     Calcium 8.5 (*)     All other components within normal limits     Background  Allergies: No Known Allergies  Current Medications:   Medications Administered         potassium bicarb-citric acid (EFFER-K) effervescent  tablet 40 mEq Admin Date  12/28/2024 Action  Given Dose  40 mEq Route  Oral Documented By  Edmond Shook RN            History: History reviewed. No pertinent past medical history.    Assessment  Vitals:     Vitals:    12/28/24 0030 12/28/24 0032   BP: 116/86 116/86   Pulse: 69    Temp: 99 °F (37.2 °C)    TempSrc: Oral    SpO2: 96% 95%   Weight:  77.1 kg (170 lb)   Height:  1.448 m (4' 9\")     Predictive Model Details   No score data available for Deterioration Index      NPO? No  O2 Flow Rate: O2 Device: None (Room air)    Cardiac Rhythm:   NIH Score: NIH     Active LDA's:    Pertinent or High Risk Medications/Drips: no   If Yes, please provide details:   Blood Product Administration: no  If Yes, please provide details:   Sepsis Risk Score      Admitted with Sepsis? No    Recommendation  Incomplete orders:   Patient Belongings:   Additional Comments:   If any further questions, please call Sending RN at [phone]    Electronically signed by: Electronically signed by Edmond Shook RN on 12/28/2024 at 3:42 AM

## 2024-12-28 NOTE — DISCHARGE INSTRUCTIONS
[facility]  Mental Health Resources*    Crisis Resources  988 Suicide and Crisis Hotline  If you or someone you know needs support now, call or text 988 or chat [employer]    Suicide Prevention Lifeline  2-076-426-KAYE(2291)    Crisis Text Link  Text [place] to 146132    [facility]  Emergency Crisis Intervention Line   [phone]    Mental Health Providers     [facility]   [address] [address]   www.childwatchca.org   [phone]   Trauma (Child/Adolescent/Teen)  Patients under 18 years old accepted   Free Service  [facility]   [address] [place] [address] [address]   [phone]   Individual, couple, family counseling, children's counseling, depression, anxiety etc.  Patients under 18 years old accepted   Accepts Medicaid, Medicare, most Commercial Insurances  [facility]   [address] [address]   [email or web address]   [phone]   Patients under 18 years old accepted   Accepts Medicaid, Multiple Commercial   [facility]   [address] [place] [address]   [email or web address]   [phone]   Patients under 18 years old accepted    Accepts Most Commercial Insurances, [place] Medicaid, Medicare  [facility]  [email or web address]   Patients under 18 years old accepted    Accepts Medicaid, Medicare, Most Private Insurances  [facility]  [address]; [place]  [address]  [phone] option 6  [facility] [address] [facility]; [place]  [address]  [phone] option 5  [facility] - [facility]  [address]; [place] [address]  [phone] option 7  [facility]   [address] [place] [address]  [address] [facility]

## 2024-12-28 NOTE — PROGRESS NOTES
SW spoke with pt about a safe discharge plan for when she is ready to leave the hospital, and she reports living alone in [place], and said she plans on returning home after discharge. Pt isn't sure if someone will be able to pick her up at the time of her discharge, and may need transportation assistance. Pt denies receiving current outpatient services, but said she prefers to follow up with [facility] in [place]. SW will contact [facility] on Monday and schedule to pt's follow up appointments.

## 2024-12-28 NOTE — PROGRESS NOTES
Transport and security here to transfer pt to 423.  All of pts belongings sent with transport including items locked in safe.

## 2024-12-29 VITALS
RESPIRATION RATE: 16 BRPM | OXYGEN SATURATION: 95 % | DIASTOLIC BLOOD PRESSURE: 65 MMHG | SYSTOLIC BLOOD PRESSURE: 100 MMHG | TEMPERATURE: 97.4 F | HEART RATE: 84 BPM

## 2024-12-29 PROBLEM — E83.42 HYPOMAGNESEMIA: Status: ACTIVE | Noted: 2024-12-29

## 2024-12-29 PROBLEM — E87.6 HYPOKALEMIA: Status: ACTIVE | Noted: 2024-12-29

## 2024-12-29 LAB — MAGNESIUM SERPL-MCNC: 1.5 MG/DL (ref 1.6–2.6)

## 2024-12-29 PROCEDURE — 36415 COLL VENOUS BLD VENIPUNCTURE: CPT

## 2024-12-29 PROCEDURE — 6360000002 HC RX W HCPCS: Performed by: FAMILY MEDICINE

## 2024-12-29 PROCEDURE — G0378 HOSPITAL OBSERVATION PER HR: HCPCS

## 2024-12-29 PROCEDURE — 96366 THER/PROPH/DIAG IV INF ADDON: CPT

## 2024-12-29 PROCEDURE — 83735 ASSAY OF MAGNESIUM: CPT

## 2024-12-29 RX ADMIN — MAGNESIUM SULFATE HEPTAHYDRATE 2000 MG: 40 INJECTION, SOLUTION INTRAVENOUS at 06:28

## 2024-12-29 NOTE — DISCHARGE SUMMARY
Discharge Summary    Patient ID: Karen Duque  MRN: [id number]     Acct:  [id number]       Patient's PCP: No primary care provider on file.    Admit Date: 12/28/2024     Discharge Date:   12/29/2024    Admitting Physician: Yareli Rivera MD    Discharge Physician: James Henson MD     Active Discharge Diagnoses:    Primary Problem  Hypokalemia  Hospital Problems  Active Hospital Problems    Diagnosis Date Noted    Hypokalemia [E87.6] 12/29/2024       The patient was seen and examined on day of discharge and this discharge summary is in conjunction with the daily progress note from day of discharge.    Code Status:  Prior    Hospital Course: Psychiatric patient transferred for electrolyte abnormalities. Magnesium and potassium repleted and she is stable for discharge home. Contacted psychiatry and they have cleared her as she is no longer suicidal. She will need follow-up with her PCP outpatient.  As clear etiology with recent diarrhea and vomiting from GI illness    The patient was admitted for the above noted medical/surgical issues. Please see daily progress note for further details concerning their stay. The patient improved throughout their stay and reached maximum medical improvement on the day of discharge. The patient/family agree with the treatment plan as outlined above. All questions concerning their stay were answered prior to discharge. They understand the importance of follow up concerning any abnormal test results.       Consults:  None    Disposition: Home    Discharged Condition: Stable    Follow Up: PCP 1 week        Diet: ADULT DIET; Regular    Discharge Medications:      Medication List        CONTINUE taking these medications      hydrOXYzine HCl 25 MG tablet  Commonly known as: ATARAX  Take 1 tablet by mouth 3 times daily as needed for Anxiety     magnesium oxide 400 (240 Mg) MG tablet  Commonly known as: MAG-OX  Take 1 tablet by mouth daily     melatonin 5 MG Tbdp disintegrating

## 2024-12-29 NOTE — PROGRESS NOTES
Behavioral Health   Discharge Note  Bridge Appointment completed: Reviewed Discharge Instructions with patient.    Patient verbalizes understanding and agreement with the discharge plan using the teachback method.     Referral for Outpatient Tobacco Cessation Counseling, upon discharge (neeta X if applicable and completed):    ( )  Hospital staff assisted patient to call Quit Line or faxed referral                                   during hospitalization                  ( )  Recognizing danger situations (included triggers and roadblocks), if not completed on admission                    ( )  Coping skills (new ways to manage stress, exercise, relaxation techniques, changing routine, distraction), if not completed on admission                                                           ( )  Basic information about quitting (benefits of quitting, techniques in how to quit, available resources, if not completed on admission  ( ) [facility]   ( ) Patient refused referral  ( ) Patient refused counseling  ( ) Patient refused smoking cessation medication upon discharge  ( ) Patient non-tobacco use    Vaccinations (neeta X if applicable and completed):  ( ) Patient states already received influenza vaccine elsewhere  ( ) Patient received influenza vaccine during this hospitalization  ( ) Patient refused influenza vaccine at this time      Pt discharged with followings belongings:  Dental Appliances: None  Vision - Corrective Lenses: None  Hearing Aid: None  Jewelry: Other (Comment) (see belongings document)  Body Piercings Removed: N/A  Clothing: Other (Comment) (see belongings document)  Other Valuables: Other (Comment) (see belongings document)   Valuables sent with patient to 4th floor  Valuables retrieved from safe and returned to patient.    Patient left department with   security and  transport via wheelchair , discharged to 4th floor room 423  Suicidal Ideations? No Risk

## 2024-12-29 NOTE — H&P
No family history on file.     Review of systems:  Review of Systems   Constitutional:  Negative for chills and fever.   HENT:  Negative for congestion and sore throat.    Respiratory:  Negative for choking and shortness of breath.    Gastrointestinal:  Negative for abdominal distention and anal bleeding.   Genitourinary:  Negative for dysuria and urgency.   Musculoskeletal:  Negative for arthralgias and back pain.   Neurological:  Negative for dizziness and headaches.   Psychiatric/Behavioral:  Negative for agitation and confusion.         A full 14 point review of systems is otherwise negative outside listed above and HPI      Physical Exam:        Physical Exam  Constitutional:       Appearance: Normal appearance.   HENT:      Head: Normocephalic and atraumatic.      Nose: Nose normal.      Mouth/Throat:      Mouth: Mucous membranes are moist.   Eyes:      Extraocular Movements: Extraocular movements intact.      Pupils: Pupils are equal, round, and reactive to light.   Cardiovascular:      Rate and Rhythm: Normal rate and regular rhythm.      Heart sounds: No murmur heard.  Pulmonary:      Effort: Pulmonary effort is normal. No respiratory distress.      Breath sounds: Normal breath sounds.   Abdominal:      General: Abdomen is flat. There is no distension.      Palpations: Abdomen is soft.   Musculoskeletal:         General: Normal range of motion.      Cervical back: Normal range of motion and neck supple.   Skin:     General: Skin is warm.      Capillary Refill: Capillary refill takes less than 2 seconds.   Neurological:      General: No focal deficit present.      Mental Status: She is alert.   Psychiatric:         Mood and Affect: Mood normal.                 CBC:   Recent Labs     12/28/24  0103   WBC 8.5   HGB 11.7*        BMP:    Recent Labs     12/28/24  0103 12/28/24  0918 12/28/24  1357    143 143   K 2.9* 3.0* 3.8    102 102   CO2 24 27 28   BUN 11 8 7   CREATININE 0.9 0.6 0.7

## 2024-12-29 NOTE — DISCHARGE SUMMARY
Discharge Summary     Patient ID:  Karen Duque  [id number]  34 y.o.  1990    Admit date: 2024  Discharge date: 2024    Admitting Physician: Jessee Gruber MD   Attending Physician: No att. providers found  Discharge Provider: JESSEE GRUBER MD     Chief Complaint: suicidal ideations    Admission Diagnoses: Suicidal ideation [R45.851]  Depression with suicidal ideation [F32.A, R45.851]    Discharge Diagnoses: Mood disorder, unspecified  Tobacco use disorder  Relationship issues  Unemployment    Admission Condition: poor    Discharged Condition: fair    Indication for Admission: Safety concern    HPI:   The patient is a 34 y.o. female with no reported previous psychiatric history, who has been admitted to our psychiatric unit for mental health evaluation, due to safety concern, after patient sent her pictures to friend holding a knife to her throat.      For initial psychiatric evaluation, please, refer to the note of psychiatry [facility] nurse  Waleska Sethi RN, as reflected below:  \"PT states reason for ED visit, \"I was going through an emotional episode.  I got a lot going on.  They told me that I could come here and get a counselor and do outpatient.  The police told me that.  They told me that I wouldn't be there very long.  I think that is what I needed.  Someone to talk to.  I don't have nobody to talk to now.  The counselor will help me to get my feelings and thoughts out.  They will give me advice to fix it and stop it.  That's what I need.  I am not a medicine person.  Just someone to talk to.  My mom , my daughter , and my granny .  All in the last few years.  I have no support.  That's where a counselor will help me with my problems.  I took that picture of the knife to my throat.  I didn't mean to send it.  I was just having an emotional break.  Sometimes, I take pictures and then delete them.  I don't intend to harm myself.  If I was, it took the ambulance so long to get

## 2024-12-30 ENCOUNTER — FOLLOWUP TELEPHONE ENCOUNTER (OUTPATIENT)
Dept: PSYCHIATRY | Age: 34
End: 2024-12-30

## 2024-12-31 LAB
EKG P AXIS: 60 DEGREES
EKG P-R INTERVAL: 156 MS
EKG Q-T INTERVAL: 352 MS
EKG QRS DURATION: 70 MS
EKG QTC CALCULATION (BAZETT): 402 MS
EKG T AXIS: 0 DEGREES

## 2025-01-11 ENCOUNTER — HOSPITAL ENCOUNTER (INPATIENT)
Age: 35
LOS: 2 days | Discharge: HOME | DRG: 881 | End: 2025-01-13
Attending: EMERGENCY MEDICINE | Admitting: PSYCHIATRY & NEUROLOGY
Payer: MEDICAID

## 2025-01-11 DIAGNOSIS — F32.A DEPRESSION WITH SUICIDAL IDEATION: Primary | ICD-10-CM

## 2025-01-11 DIAGNOSIS — R45.851 DEPRESSION WITH SUICIDAL IDEATION: Primary | ICD-10-CM

## 2025-01-11 LAB
ALBUMIN SERPL-MCNC: 4.1 G/DL (ref 3.5–5.2)
ALP SERPL-CCNC: 65 U/L (ref 35–104)
ALT SERPL-CCNC: 8 U/L (ref 5–33)
AMPHET UR QL SCN: NEGATIVE
ANION GAP SERPL CALCULATED.3IONS-SCNC: 12 MMOL/L (ref 7–19)
AST SERPL-CCNC: 11 U/L (ref 5–32)
BACTERIA #/AREA URNS HPF: NORMAL /HPF
BARBITURATES UR QL SCN: NEGATIVE
BASOPHILS # BLD: 0.1 K/UL (ref 0–0.2)
BASOPHILS NFR BLD: 0.6 % (ref 0–1)
BENZODIAZ UR QL SCN: POSITIVE
BILIRUB SERPL-MCNC: <0.2 MG/DL (ref 0.2–1.2)
BILIRUB UR QL STRIP: NEGATIVE
BUN SERPL-MCNC: 11 MG/DL (ref 6–20)
BUPRENORPHINE URINE: NEGATIVE
CALCIUM SERPL-MCNC: 8.2 MG/DL (ref 8.6–10)
CANNABINOIDS UR QL SCN: NEGATIVE
CHLORIDE SERPL-SCNC: 101 MMOL/L (ref 98–111)
CLARITY UR: ABNORMAL
CO2 SERPL-SCNC: 24 MMOL/L (ref 22–29)
COCAINE UR QL SCN: POSITIVE
COLOR UR: YELLOW
CREAT SERPL-MCNC: 0.8 MG/DL (ref 0.5–0.9)
DRUG SCREEN COMMENT UR-IMP: ABNORMAL
EOSINOPHIL # BLD: 0 K/UL (ref 0–0.6)
EOSINOPHIL NFR BLD: 0.4 % (ref 0–5)
ERYTHROCYTE [DISTWIDTH] IN BLOOD BY AUTOMATED COUNT: 13.4 % (ref 11.5–14.5)
ETHANOLAMINE SERPL-MCNC: <10 MG/DL (ref 0–0.08)
FENTANYL SCREEN, URINE: NEGATIVE
GLUCOSE SERPL-MCNC: 153 MG/DL (ref 70–99)
GLUCOSE UR STRIP.AUTO-MCNC: NEGATIVE MG/DL
HCG SERPL QL: NEGATIVE
HCT VFR BLD AUTO: 35.9 % (ref 37–47)
HGB BLD-MCNC: 12 G/DL (ref 12–16)
HGB UR STRIP.AUTO-MCNC: NEGATIVE MG/L
IMM GRANULOCYTES # BLD: 0 K/UL
KETONES UR STRIP.AUTO-MCNC: NEGATIVE MG/DL
LEUKOCYTE ESTERASE UR QL STRIP.AUTO: NEGATIVE
LYMPHOCYTES # BLD: 2.6 K/UL (ref 1.1–4.5)
LYMPHOCYTES NFR BLD: 30.2 % (ref 20–40)
MCH RBC QN AUTO: 29.1 PG (ref 27–31)
MCHC RBC AUTO-ENTMCNC: 33.4 G/DL (ref 33–37)
MCV RBC AUTO: 86.9 FL (ref 81–99)
METHADONE UR QL SCN: NEGATIVE
METHAMPHETAMINE, URINE: NEGATIVE
MONOCYTES # BLD: 0.7 K/UL (ref 0–0.9)
MONOCYTES NFR BLD: 7.9 % (ref 0–10)
NEUTROPHILS # BLD: 5.1 K/UL (ref 1.5–7.5)
NEUTS SEG NFR BLD: 60.5 % (ref 50–65)
NITRITE UR QL STRIP.AUTO: NEGATIVE
OPIATES UR QL SCN: NEGATIVE
OXYCODONE UR QL SCN: NEGATIVE
PCP UR QL SCN: NEGATIVE
PH UR STRIP.AUTO: 5.5 [PH] (ref 5–8)
PLATELET # BLD AUTO: 326 K/UL (ref 130–400)
PMV BLD AUTO: 10.9 FL (ref 9.4–12.3)
POTASSIUM SERPL-SCNC: 3.6 MMOL/L (ref 3.5–5)
PROT SERPL-MCNC: 7 G/DL (ref 6.4–8.3)
PROT UR STRIP.AUTO-MCNC: 100 MG/DL
RBC # BLD AUTO: 4.13 M/UL (ref 4.2–5.4)
RBC #/AREA URNS HPF: NORMAL /HPF (ref 0–2)
SARS-COV-2 RDRP RESP QL NAA+PROBE: NOT DETECTED
SODIUM SERPL-SCNC: 137 MMOL/L (ref 136–145)
SP GR UR STRIP.AUTO: 1.01 (ref 1–1.03)
SQUAMOUS #/AREA URNS HPF: NORMAL /HPF
TRICYCLIC ANTIDEPRESSANTS, UR: NEGATIVE
UROBILINOGEN UR STRIP.AUTO-MCNC: 0.2 E.U./DL
WBC # BLD AUTO: 8.4 K/UL (ref 4.8–10.8)
WBC #/AREA URNS HPF: NORMAL /HPF (ref 0–5)

## 2025-01-11 PROCEDURE — 81001 URINALYSIS AUTO W/SCOPE: CPT

## 2025-01-11 PROCEDURE — 83036 HEMOGLOBIN GLYCOSYLATED A1C: CPT

## 2025-01-11 PROCEDURE — 82607 VITAMIN B-12: CPT

## 2025-01-11 PROCEDURE — 6370000000 HC RX 637 (ALT 250 FOR IP): Performed by: PSYCHIATRY & NEUROLOGY

## 2025-01-11 PROCEDURE — 87635 SARS-COV-2 COVID-19 AMP PRB: CPT

## 2025-01-11 PROCEDURE — 82077 ASSAY SPEC XCP UR&BREATH IA: CPT

## 2025-01-11 PROCEDURE — 80053 COMPREHEN METABOLIC PANEL: CPT

## 2025-01-11 PROCEDURE — 84443 ASSAY THYROID STIM HORMONE: CPT

## 2025-01-11 PROCEDURE — 80307 DRUG TEST PRSMV CHEM ANLYZR: CPT

## 2025-01-11 PROCEDURE — 1240000000 HC EMOTIONAL WELLNESS R&B

## 2025-01-11 PROCEDURE — G0480 DRUG TEST DEF 1-7 CLASSES: HCPCS

## 2025-01-11 PROCEDURE — 99285 EMERGENCY DEPT VISIT HI MDM: CPT

## 2025-01-11 PROCEDURE — 84703 CHORIONIC GONADOTROPIN ASSAY: CPT

## 2025-01-11 PROCEDURE — 85025 COMPLETE CBC W/AUTO DIFF WBC: CPT

## 2025-01-11 PROCEDURE — 82306 VITAMIN D 25 HYDROXY: CPT

## 2025-01-11 PROCEDURE — 36415 COLL VENOUS BLD VENIPUNCTURE: CPT

## 2025-01-11 RX ORDER — MECOBALAMIN 5000 MCG
5 TABLET,DISINTEGRATING ORAL
Status: DISCONTINUED | OUTPATIENT
Start: 2025-01-11 | End: 2025-01-13 | Stop reason: HOSPADM

## 2025-01-11 RX ORDER — HYDROXYZINE HYDROCHLORIDE 25 MG/1
25 TABLET, FILM COATED ORAL 3 TIMES DAILY PRN
Status: DISCONTINUED | OUTPATIENT
Start: 2025-01-11 | End: 2025-01-13 | Stop reason: HOSPADM

## 2025-01-11 RX ORDER — TRAZODONE HYDROCHLORIDE 50 MG/1
50 TABLET, FILM COATED ORAL NIGHTLY PRN
Status: DISCONTINUED | OUTPATIENT
Start: 2025-01-11 | End: 2025-01-13 | Stop reason: HOSPADM

## 2025-01-11 RX ORDER — POLYETHYLENE GLYCOL 3350 17 G/17G
17 POWDER, FOR SOLUTION ORAL DAILY PRN
Status: DISCONTINUED | OUTPATIENT
Start: 2025-01-11 | End: 2025-01-13 | Stop reason: HOSPADM

## 2025-01-11 RX ORDER — ACETAMINOPHEN 325 MG/1
650 TABLET ORAL EVERY 4 HOURS PRN
Status: DISCONTINUED | OUTPATIENT
Start: 2025-01-11 | End: 2025-01-13 | Stop reason: HOSPADM

## 2025-01-11 RX ADMIN — TRAZODONE HYDROCHLORIDE 50 MG: 50 TABLET ORAL at 21:44

## 2025-01-11 RX ADMIN — Medication 5 MG: at 21:44

## 2025-01-11 RX ADMIN — ACETAMINOPHEN 650 MG: 325 TABLET ORAL at 21:44

## 2025-01-11 ASSESSMENT — PATIENT HEALTH QUESTIONNAIRE - PHQ9
SUM OF ALL RESPONSES TO PHQ QUESTIONS 1-9: 17
5. POOR APPETITE OR OVEREATING: SEVERAL DAYS
7. TROUBLE CONCENTRATING ON THINGS, SUCH AS READING THE NEWSPAPER OR WATCHING TELEVISION: MORE THAN HALF THE DAYS
4. FEELING TIRED OR HAVING LITTLE ENERGY: MORE THAN HALF THE DAYS
SUM OF ALL RESPONSES TO PHQ QUESTIONS 1-9: 17
SUM OF ALL RESPONSES TO PHQ9 QUESTIONS 1 & 2: 4
10. IF YOU CHECKED OFF ANY PROBLEMS, HOW DIFFICULT HAVE THESE PROBLEMS MADE IT FOR YOU TO DO YOUR WORK, TAKE CARE OF THINGS AT HOME, OR GET ALONG WITH OTHER PEOPLE: VERY DIFFICULT
9. THOUGHTS THAT YOU WOULD BE BETTER OFF DEAD, OR OF HURTING YOURSELF: SEVERAL DAYS
8. MOVING OR SPEAKING SO SLOWLY THAT OTHER PEOPLE COULD HAVE NOTICED. OR THE OPPOSITE, BEING SO FIGETY OR RESTLESS THAT YOU HAVE BEEN MOVING AROUND A LOT MORE THAN USUAL: SEVERAL DAYS
6. FEELING BAD ABOUT YOURSELF - OR THAT YOU ARE A FAILURE OR HAVE LET YOURSELF OR YOUR FAMILY DOWN: NEARLY EVERY DAY
SUM OF ALL RESPONSES TO PHQ QUESTIONS 1-9: 17
2. FEELING DOWN, DEPRESSED OR HOPELESS: MORE THAN HALF THE DAYS
1. LITTLE INTEREST OR PLEASURE IN DOING THINGS: MORE THAN HALF THE DAYS
3. TROUBLE FALLING OR STAYING ASLEEP: NEARLY EVERY DAY
SUM OF ALL RESPONSES TO PHQ QUESTIONS 1-9: 16

## 2025-01-11 ASSESSMENT — SLEEP AND FATIGUE QUESTIONNAIRES
SLEEP PATTERN: DIFFICULTY FALLING ASLEEP;INSOMNIA
DO YOU HAVE DIFFICULTY SLEEPING: YES
AVERAGE NUMBER OF SLEEP HOURS: 4
DO YOU USE A SLEEP AID: NO

## 2025-01-11 ASSESSMENT — PAIN SCALES - GENERAL: PAINLEVEL_OUTOF10: 9

## 2025-01-11 ASSESSMENT — PAIN DESCRIPTION - ORIENTATION: ORIENTATION: RIGHT

## 2025-01-11 ASSESSMENT — PAIN - FUNCTIONAL ASSESSMENT
PAIN_FUNCTIONAL_ASSESSMENT: ACTIVITIES ARE NOT PREVENTED
PAIN_FUNCTIONAL_ASSESSMENT: NONE - DENIES PAIN

## 2025-01-11 ASSESSMENT — LIFESTYLE VARIABLES
HOW MANY STANDARD DRINKS CONTAINING ALCOHOL DO YOU HAVE ON A TYPICAL DAY: 1 OR 2
HOW OFTEN DO YOU HAVE A DRINK CONTAINING ALCOHOL: MONTHLY OR LESS

## 2025-01-11 ASSESSMENT — PAIN DESCRIPTION - DESCRIPTORS: DESCRIPTORS: SORE;TENDER

## 2025-01-11 ASSESSMENT — PAIN DESCRIPTION - LOCATION: LOCATION: ARM

## 2025-01-11 NOTE — ED NOTES
Patient changed in to maroon paper scrubs, belongings sent to security, sitter and PD at bedside.

## 2025-01-11 NOTE — ED PROVIDER NOTES
[facility] EMERGENCY DEPARTMENT  eMERGENCY dEPARTMENT eNCOUnter      Pt Name: Karen Duque  MRN: [id number]  Birthdate 1990  Date of evaluation: 1/11/2025  Provider: Jericho Elizabeth MD    CHIEF COMPLAINT       Chief Complaint   Patient presents with    Mental Health Problem     To ER via PPD,   SI         HISTORY OF PRESENT ILLNESS   (Location/Symptom, Timing/Onset,Context/Setting, Quality, Duration, Modifying Factors, Severity)  Note limiting factors.   Karen Duque is a 34 y.o. female who presents to the emergency department for relation regarding depression and thoughts about wanting to harm herself.  Patient presents here to the ED with [place] police.  Apparently they were called by her ex-boyfriend after patient had made statements via text message that she was going to end her life.  He reported that they have been going through a break-up and he was concerned about her.  Police reported upon their arrival patient would not open the door and she made a statement to them that they would not talk to her alive.  I have reviewed the police report and citation for additional details.  On arrival to the ED patient is alert, speaking and answering questions.  She is calm and cooperative.  She does have a prior history of recent inpatient psychiatric hospitalization.  Denies recent illnesses, fever or chills.    HPI    NursingNotes were reviewed.    REVIEW OF SYSTEMS    (2-9 systems for level 4, 10 or more for level 5)     Review of Systems   Constitutional:  Negative for chills and fever.   Respiratory:  Negative for shortness of breath.    Cardiovascular:  Negative for chest pain.   Gastrointestinal:  Negative for abdominal pain.   Psychiatric/Behavioral:  Positive for suicidal ideas. The patient is nervous/anxious.    All other systems reviewed and are negative.           PAST MEDICALHISTORY   History reviewed. No pertinent past medical history.      SURGICAL HISTORY       Past Surgical History:   Procedure

## 2025-01-12 PROBLEM — F39 UNSPECIFIED MOOD (AFFECTIVE) DISORDER (HCC): Status: ACTIVE | Noted: 2025-01-12

## 2025-01-12 LAB
25(OH)D3 SERPL-MCNC: 7.1 NG/ML
HBA1C MFR BLD: 6 % (ref 4–5.6)
TSH SERPL DL<=0.005 MIU/L-ACNC: 1.06 UIU/ML (ref 0.27–4.2)
VIT B12 SERPL-MCNC: 386 PG/ML (ref 232–1245)

## 2025-01-12 PROCEDURE — 1240000000 HC EMOTIONAL WELLNESS R&B

## 2025-01-12 PROCEDURE — 6370000000 HC RX 637 (ALT 250 FOR IP): Performed by: PSYCHIATRY & NEUROLOGY

## 2025-01-12 PROCEDURE — 99223 1ST HOSP IP/OBS HIGH 75: CPT | Performed by: PSYCHIATRY & NEUROLOGY

## 2025-01-12 RX ADMIN — Medication 5 MG: at 21:13

## 2025-01-12 RX ADMIN — ACETAMINOPHEN 650 MG: 325 TABLET ORAL at 21:13

## 2025-01-12 RX ADMIN — TRAZODONE HYDROCHLORIDE 50 MG: 50 TABLET ORAL at 21:13

## 2025-01-12 ASSESSMENT — PAIN DESCRIPTION - LOCATION: LOCATION: GENERALIZED

## 2025-01-12 ASSESSMENT — PAIN SCALES - GENERAL
PAINLEVEL_OUTOF10: 0
PAINLEVEL_OUTOF10: 0
PAINLEVEL_OUTOF10: 3

## 2025-01-12 ASSESSMENT — PAIN DESCRIPTION - DESCRIPTORS: DESCRIPTORS: NAGGING

## 2025-01-12 ASSESSMENT — PAIN - FUNCTIONAL ASSESSMENT: PAIN_FUNCTIONAL_ASSESSMENT: ACTIVITIES ARE NOT PREVENTED

## 2025-01-12 NOTE — H&P
HISTORY and PHYSICAL      CHIEF COMPLAINT:  SI    Reason for Admission:  SI    History Obtained From:  patient, chart    HISTORY OF PRESENT ILLNESS:      The patient is a 34 y.o. female who is admitted to the [facility] unit with worsening mood issues. She has no c/o chest pain or SOA. She has had no abdominal pain or N/V. She is eating ok. No fevers. No dysuria.     Past Medical History:    History reviewed. No pertinent past medical history.  Past Surgical History:        Procedure Laterality Date    DILATION AND CURETTAGE OF UTERUS           Medications Prior to Admission:    Medications Prior to Admission: hydrOXYzine HCl (ATARAX) 25 MG tablet, Take 1 tablet by mouth 3 times daily as needed for Anxiety (Patient not taking: Reported on 1/11/2025)  traZODone (DESYREL) 50 MG tablet, Take 1 tablet by mouth nightly as needed for Sleep (Patient not taking: Reported on 1/11/2025)  melatonin 5 MG TBDP disintegrating tablet, Take 1 tablet by mouth nightly as needed (sleep) (Patient not taking: Reported on 1/11/2025)  magnesium oxide (MAG-OX) 400 (240 Mg) MG tablet, Take 1 tablet by mouth daily (Patient not taking: Reported on 1/11/2025)  potassium bicarb-citric acid (EFFER-K) 20 MEQ TBEF effervescent tablet, Take 2 tablets by mouth every 6 hours for 2 doses    Allergies:  Patient has no known allergies.    Social History:   TOBACCO:   reports that she has never smoked. She has never used smokeless tobacco.  ETOH:   reports no history of alcohol use.  DRUGS:   reports no history of drug use.      Family History:   History reviewed. No pertinent family history.  REVIEW OF SYSTEMS:  Constitutional: neg  CV: neg  Pulmonary: neg  GI: neg  : neg  Psych: SI  Neuro: neg  Skin: neg  MusculoSkeletal: neg  HEENT: neg  Joints: neg    Vitals:  BP 92/64   Pulse 91   Temp 97.9 °F (36.6 °C)   Resp 18   Wt 83 kg (183 lb)   LMP 12/21/2024   SpO2 97%   BMI 39.60 kg/m²

## 2025-01-12 NOTE — H&P
Department of Psychiatry  History and Physical - Adult         CHIEF COMPLAINT:  suicidal ideations    History obtained from:  patient    HISTORY OF PRESENT ILLNESS:          The patient is a 34 y.o. female with previous psychiatric history of unspecified mood disorder, who has been admitted to our psychiatric unit, secondary to treatment noncompliance, drugs intoxication and suicidal ideations.  Patient's UDS in ER was positive for cocaine and nonprescribed benzodiazepines.  Her blood alcohol level was less than 10.    Patient is well-known to psychiatry due to recent admission to our psychiatric unit, secondary to the depression and suicidal gestures.    For initial psychiatric evaluation, please, refer to the notes of ER attending and psychiatry [facility] nurse  Meghan Schmitz RN, as reflected below:  \"PT states reason for ED visit, \"I am mentally ill.  I went crazy.  I told my ex that I was going to drown myself.  He called the police.  When police came I turned my music up and didn't let them in.  I left my apartment and the police found me in the area around my house.  I am a nobody. Just look at me.\"  Admits to making suicidal statements and feeling that way at time of incident earlier today.  Admits intermittent SI for just today.  Reports she asked ex if he was coming home or not and was ignored and that triggered thoughts.  Denies HI and AVH. Denies SI at this time but admits to feeling hopeless and worthless. Shares that 1/10/25 PM, a friend gave her some drugs  and a prescription pill and what she believes was cocaine that she snorted.  She states, \"I did everything they offered me last night alcohol, something in a prescription bottle.\"  Calm during this interview. Laughed inappropriately a few times at her own statements of self deprecation. Shares she has no support system and cannot identify a reason for living.  Denies any past h/o drug/alcohol use except for 1/10/25 PM. Stated, \"I felt better

## 2025-01-12 NOTE — DISCHARGE INSTRUCTIONS
ID/documentation. Contact for information.    [facility]  [address] [place], [place] [address]  [phone]  [facility]  [address] [place], [place] [address]  [phone]  [facility]   [address] [place] [address]  [phone]  [address] [place] [address]  [phone]  [facility]  [address] [place] [address]  [phone]  [facility]   [phone]  [facility]   [phone]    [facility]   Typically serve a daily meal and serve as point of contact for Meals on Wheels program  [facility]  [address], [place] [id number] [phone]    [facility] Pant[facility]  Food distribution. Most require person to bring ID/documentation. Contact for information.    [facility]  [address] [place], [place] [id number] [phone]  [address] [id number] [phone]     [facility]  Typically serve a daily lunch during the week- contact for meal times.  [employer]  [address], [place] [id number] [phone]  Serving a free lunch from 11:00 AM to 1:00 PM Monday through Friday. Please call to see if meal delivery is an option.    Food Pantry  Food distribution. Most require person to bring ID/documentation. Contact for information.    [address] [address]  [phone]  [facility]  [address] [place], [place] [address]  [phone]  [facility]  [address] [place] [address]  [phone]  [facility]  [address] [place] [address]  [phone]  [facility]  [address] [place], [place] [address]  [phone]    [employer]  Community supported miniature pantry filled with non-perishables. Take

## 2025-01-12 NOTE — ED NOTES
ED TO INPATIENT SBAR HANDOFF    Patient Name: Karen Duque   : 1990  34 y.o.   Family/Caregiver Present: No  Code Status Order: Prior    C-SSRS: Risk of Suicide: High Risk  Sitter Yes  Restraints:         Situation  Chief Complaint:   Chief Complaint   Patient presents with    Mental Health Problem     To ER via PPD,   SI     Patient Diagnosis: No admission diagnoses are documented for this encounter.     Brief Description of Patient's Condition: pt came from home on a  after barricading herself in her house and saying she was going to kill herself, pt has been cooperative in the ER  Mental Status: oriented, alert, coherent, logical, thought processes intact, and able to concentrate and follow conversation  Arrived from: home    Imaging:   No orders to display     COVID-19 Results:   Internal Administration   First Dose      Second Dose           Last COVID Lab SARS-CoV-2, NAAT (no units)   Date Value   2025 Not Detected           Abnormal labs:   Abnormal Labs Reviewed   CBC WITH AUTO DIFFERENTIAL - Abnormal; Notable for the following components:       Result Value    RBC 4.13 (*)     Hematocrit 35.9 (*)     All other components within normal limits   COMPREHENSIVE METABOLIC PANEL - Abnormal; Notable for the following components:    Glucose 153 (*)     Calcium 8.2 (*)     All other components within normal limits   DRUG SCRN, BUPRENORPHINE - Abnormal; Notable for the following components:    Benzodiazepine Screen, Urine POSITIVE (*)     Cocaine Metabolite Screen, Urine POSITIVE (*)     All other components within normal limits   URINALYSIS WITH REFLEX TO CULTURE - Abnormal; Notable for the following components:    Clarity, UA TURBID (*)     Protein,  (*)     All other components within normal limits     Background  Allergies: No Known Allergies  Current Medications:     History: History reviewed. No pertinent past medical history.    Assessment  Vitals: Level of Consciousness: Alert (0)

## 2025-01-12 NOTE — PLAN OF CARE
Problem: Self Harm/Suicidality  Goal: Will have no self-injury during hospital stay  Description: INTERVENTIONS:  1.  Ensure constant observer at bedside with Q15M safety checks  2.  Maintain a safe environment  3.  Secure patient belongings  4.  Ensure family/visitors adhere to safety recommendations  5.  Ensure safety tray has been added to patient's diet order  6.  Every shift and PRN: Re-assess suicidal risk via Frequent Screener    Outcome: Progressing     Problem: Depression  Goal: Will be euthymic at discharge  Description: INTERVENTIONS:  1. Administer medication as ordered  2. Provide emotional support via 1:1 interaction with staff  3. Encourage involvement in milieu/groups/activities  4. Monitor for social isolation  Outcome: Progressing     Problem: Anxiety  Goal: Will report anxiety at manageable levels  Description: INTERVENTIONS:  1. Administer medication as ordered  2. Teach and rehearse alternative coping skills  3. Provide emotional support with 1:1 interaction with staff  Outcome: Progressing     Problem: Sleep Disturbance  Goal: Will exhibit normal sleeping pattern  Description: INTERVENTIONS:  1. Administer medication as ordered  2. Decrease environmental stimuli, including noise, as appropriate  3. Discourage social isolation and naps during the day  Outcome: Progressing     Problem: Pain  Goal: Verbalizes/displays adequate comfort level or baseline comfort level  Outcome: Progressing

## 2025-01-12 NOTE — ED PROVIDER NOTES
18:30 patient signed out to me by  due to end of shift.  Patient is here for depression with suicidal ideation.  Patient is currently on a 202A per PD.      20:30 Heather, psychiatric PA-C, has evaluated patient in the emergency department.  Dr. Gruber, psychiatrist, will admit patient for further evaluation and treatment.  Patient is aware of current plan of care.     Paula Mendoza MD  01/11/25 2033

## 2025-01-13 VITALS
OXYGEN SATURATION: 99 % | RESPIRATION RATE: 14 BRPM | HEART RATE: 85 BPM | WEIGHT: 183 LBS | DIASTOLIC BLOOD PRESSURE: 79 MMHG | BODY MASS INDEX: 39.6 KG/M2 | TEMPERATURE: 97.7 F | SYSTOLIC BLOOD PRESSURE: 95 MMHG

## 2025-01-13 PROCEDURE — 5130000000 HC BRIDGE APPOINTMENT

## 2025-01-13 PROCEDURE — 99239 HOSP IP/OBS DSCHRG MGMT >30: CPT | Performed by: PSYCHIATRY & NEUROLOGY

## 2025-01-13 RX ORDER — HYDROXYZINE HYDROCHLORIDE 25 MG/1
25 TABLET, FILM COATED ORAL 3 TIMES DAILY PRN
Qty: 90 TABLET | Refills: 0 | Status: SHIPPED | OUTPATIENT
Start: 2025-01-13

## 2025-01-13 RX ORDER — TRAZODONE HYDROCHLORIDE 50 MG/1
50 TABLET, FILM COATED ORAL NIGHTLY PRN
Qty: 30 TABLET | Refills: 0 | Status: SHIPPED | OUTPATIENT
Start: 2025-01-13

## 2025-01-13 RX ORDER — MECOBALAMIN 5000 MCG
5 TABLET,DISINTEGRATING ORAL
Qty: 30 TABLET | Refills: 0 | Status: SHIPPED | OUTPATIENT
Start: 2025-01-13

## 2025-01-13 RX ORDER — ERGOCALCIFEROL 1.25 MG/1
50000 CAPSULE, LIQUID FILLED ORAL WEEKLY
Qty: 5 CAPSULE | Refills: 0 | Status: SHIPPED | OUTPATIENT
Start: 2025-01-13

## 2025-01-13 NOTE — DISCHARGE SUMMARY
Discharge Summary     Patient ID:  Karen Duque  [id number]  34 y.o.  1990    Admit date: 1/11/2025  Discharge date: 1/13/2025    Admitting Physician: Jessee Gruber MD   Attending Physician: Jessee Gruber MD  Discharge Provider: JESSEE GRUBER MD     Chief Complaint: Suicidal ideations    Admission Diagnoses: Depression with suicidal ideation [F32.A, R45.851]  Unspecified mood (affective) disorder (HCC) [F39]    Discharge Diagnoses: Mood disorder, unspecified  Tobacco use disorder  Stimulants abuse  Benzodiazepine abuse  Relationship issues  Rule out borderline personality disorder  Unemployment  Treatment noncompliance    Admission Condition: Poor    Discharged Condition: stable    Indication for Admission: Treatment noncompliance, drugs intoxication, suicidal ideations    HPI:  The patient is a 34 y.o. female with previous psychiatric history of unspecified mood disorder, who has been admitted to our psychiatric unit, secondary to treatment noncompliance, drugs intoxication and suicidal ideations.  Patient's UDS in ER was positive for cocaine and nonprescribed benzodiazepines.  Her blood alcohol level was less than 10.     Patient is well-known to psychiatry due to recent admission to our psychiatric unit, secondary to the depression and suicidal gestures.     For initial psychiatric evaluation, please, refer to the notes of ER attending and psychiatry [facility] nurse  Meghan Schmitz RN, as reflected below:  \"PT states reason for ED visit, \"I am mentally ill.  I went crazy.  I told my ex that I was going to drown myself.  He called the police.  When police came I turned my music up and didn't let them in.  I left my apartment and the police found me in the area around my house.  I am a nobody. Just look at me.\"  Admits to making suicidal statements and feeling that way at time of incident earlier today.  Admits intermittent SI for just today.  Reports she asked ex if he was coming home or not and was

## 2025-01-13 NOTE — DISCHARGE INSTR - LAB
[phone]   General Behavioral Health:  Medication Assisted Treatment for Opioid Use Disorders; MRT   Patients under 18 years old accepted   Accepts Medicaid; Medicare; Private Insurance; Sliding Scale    [facility]   [address] [place]  [address]   [email or web address] [phone]   Outpatient Behavioral Health   Accepts Medicare Part B Primary/Most Secondary  [facility]   Locations in [place], [place],  and [facility] - Find a Provider ([email or web address])   [phone]   Patients under 18 years old accepted   Accepts a majority of Commercial, Medicare, and Medicaid    [facility], [facility]   [address] [facility] [id number] [phone]   Substance Abuse Counseling   Private Pay  [facility]   [address] [place] [facility] [address]   [email or web address]   [phone]   Crisis Trauma, EMDR, Addiction  Patients under 18 years old accepted   Services offered at reduced cost  [facility] at [address] [place] [address], [address]   [email or web address]   [phone] Trauma Treatment Specialist, with a focus on Holistic Psychotherapy including Cellular Release Therapy, and Hypnosis for Healing   Patients under 18 years old accepted    Sliding Scale Karlee Rehman, [facility]   [address] [facility] [address] [facility] [address]  [email or web address]   [phone] Fridays 9:00AM-5:00PM   Trauma Counseling (Child/Adolescent/Teen/Adult)); Biblical Counseling (All services with a Biblical Worldview)   Patients under 18 years old accepted   Private Pay (At this time)  [facility]   [address], [place]  [address]   [facility]   [phone] (REST)   Trauma counseling for crime victims   Patients under 18 years old accepted   Services

## 2025-01-13 NOTE — DISCHARGE INSTR - DIET

## 2025-01-13 NOTE — PLAN OF CARE
Group Therapy Note    Date: 1/13/2025  Start Time: 1100  End Time:  1130  Number of Participants: 7    Type of Group: Healthy Living/Wellness    Wellness Binder Information  Module Name:  Mental Health Wellness  Session Number:  1    Group Goal for Pt:  To improve knowledge of practical facts about depression    Notes:  Pt did not attend group activity.  Pt was invited/encouraged.    Status After Intervention:      Participation Level:     Participation Quality:       Speech:        Thought Process/Content:       Affective Functioning:       Mood:       Level of consciousness:        Response to Learning:       Endings:     Modes of Intervention:       Discipline Responsible:       Signature:  Colleen Murphy

## 2025-01-13 NOTE — PLAN OF CARE
Group Therapy Note    Date: 1/13/2025  Start Time: 1000  End Time:  1030  Number of Participants: 9    Type of Group: Psychoeducation    Wellness Binder Information  Module Name:  Relapse Prevention  Session Number:  5    Group Goal for Pt:  To improve knowledge of relapse prevention strategies    Notes:  Pt did not attend group discussion.  Pt was invited/encouraged.    Status After Intervention:      Participation Level:     Participation Quality:       Speech:        Thought Process/Content:       Affective Functioning:       Mood:       Level of consciousness:        Response to Learning:       Endings:     Modes of Intervention:       Discipline Responsible:       Signature:  Colleen Murphy

## 2025-01-13 NOTE — RESEARCH
SW was unable to obtain collateral information due to patient reporting that she has no one to list.     SW discussed the importance of taking medication as directed. Patient declined having access to weapons and denies having any in the home at this time.

## 2025-01-13 NOTE — PROGRESS NOTES
Group Note    Date: 01/12/25  Start Time: 7:30 AM   End Time:8:00 AM     Number of Participants: 15    Type of Group: Community/Goal     Patient's Goal:  Pt did not get up for group or breakfast    Discipline Responsible: Registered Nurse     Signature:  Yazmin Zuniga RN  
                                                                Group Note    Date: 01/12/25  Start Time: 8:00 PM   End Time:8:30 PM     Number of Participants: 14    Type of Group: Wrap-Up     Patient's Goal:  Did not attend    Discipline Responsible: Registered Nurse     Signature:  Jennifer Cole RN  
                                                                Group Note    Date: 01/13/25  Start Time: 8:00 AM   End Time:8:30 AM     Number of Participants: 14    Type of Group: Community/Goal     Patient's Goal:      Notes:  Did not participate    Modes of Intervention: Education and Support    Discipline Responsible: Behavioral Health Technician     Signature:  STEPHANIE TIAN  
                                                  Admission Note      Reason for admission/Target Symptom: Per nursing admission assessment - Reason for Admission: Karen Duque is a 34 YOA black female who is admitted to [facility] per services of Dr. Gruber with dx of Depression with SI.  Patient reports she texted her ex today that she was going to drown herself.  She says she did feel that way but, does not at this time.  She reports severe depression, is unable to state a reason for living and denies having a support system.  She endorses feeling worthless and hopeless and makes self depreciating statements.  She demonstrates low self esteem and self worth. She reports poor sleep and anhedonia.  She has not began taking prescribed medications from recent admit to [facility] on 12/28.  She was brought to the ED this PM on a 202A d/t police were notified of  her text about self harm. She did not answer her door for them then eloped from her apartment and was found by [place] [employer] officers.  She reports a friend gave her drugs 1/10/25 PM and she used for the first time in addition to drinking alcohol.  See UDS.    Diagnoses: Unspecified depression  UDS: Cocaine ,Benzodiazepine   BAL:  Neg    SW will meet with treatment team to discuss patient's treatment including care planning, discharge planning, and follow-up needs. Patient has been admitted to [facility].     Treatment team will identify the patient's discharge needs. Appointments will be made for medication management and outpatient therapy/counseling. Pt confirmed the need for ongoing treatment post inpatient stay. Pt was also provided a handout of contact information for drug and alcohol treatment centers and other community support service such as [facility], AA, and [facility].  
                                                 Treatment Team Note:    Therapist met with treatment team to discuss patients treatment, progress toward treatment goals and discharge plans.    Target Symptoms/Reason for admission: Per nursing admission assessment - Reason for Admission: Karen Duque is a 34 YOA black female who is admitted to [facility] per services of Dr. Gruber with dx of Depression with SI.  Patient reports she texted her ex today that she was going to drown herself.  She says she did feel that way but, does not at this time.  She reports severe depression, is unable to state a reason for living and denies having a support system.  She endorses feeling worthless and hopeless and makes self depreciating statements.  She demonstrates low self esteem and self worth. She reports poor sleep and anhedonia.  She has not began taking prescribed medications from recent admit to [facility] on 12/28.  She was brought to the ED this PM on a 202A d/t police were notified of  her text about self harm. She did not answer her door for them then eloped from her apartment and was found by [employer] [facility] officers.  She reports a friend gave her drugs 1/10/25 PM and she used for the first time in addition to drinking alcohol.  See UDS.    Diagnoses per psych provider: Depression with suicidal ideation [F32.A, R45.851]  Unspecified mood (affective) disorder (HCC) [F39]    Patient's aftercare plan is: [facility]    Aftercare appointments made: Yes    Pt lives with: patient lives alone    Collateral obtained from:  Pt refused  on 01/13/25  Collateral obtained on:    Attending groups: No    Behavior: cooperative, guarded, isolative to room    Has patient been completing ADL's:   appearance is disheveled    Sleeping:Yes    Taking medication: Yes    Misc: Pt will be discharged today.                                                    
      Behavioral Services  Medicare Certification Upon Admission    I certify that this patient's inpatient psychiatric hospital admission is medically necessary for:    [x] (1) Treatment which could reasonably be expected to improve this patient's condition,       [x] (2) Or for diagnostic study;     AND     [x](2) The inpatient psychiatric services are provided while the individual is under the care of a physician and are included in the individualized plan of care.    Estimated length of stay/service 3-5 days    Plan for post-hospital care TBD    Electronically signed by JORGE CROOKS MD on 1/12/2025 at 8:30 AM      
 Nursing Admission Note    Reason for Admission: Karen Duque is a 34 YOA black female who is admitted to [facility] per services of Dr. Gruber with dx of Depression with SI.  Patient reports she texted her ex today that she was going to drown herself.  She says she did feel that way but, does not at this time.  She reports severe depression, is unable to state a reason for living and denies having a support system.  She endorses feeling worthless and hopeless and makes self depreciating statements.  She demonstrates low self esteem and self worth. She reports poor sleep and anhedonia.  She has not began taking prescribed medications from recent admit to [facility] on 12/28.  She was brought to the ED this PM on a 202A d/t police were notified of  her text about self harm. She did not answer her door for them then eloped from her apartment and was found by [facility] officers.  She reports a friend gave her drugs 1/10/25 PM and she used for the first time in addition to drinking alcohol.  See UDS.    Additional Notes:      Patient Active Problem List   Diagnosis    Depression with suicidal ideation    Hypokalemia    Hypomagnesemia       C-SSRS:  C-SSRS Completed: yes.    Risk Assessment Completed: yes.    Risk of Suicide per C-SSRS: Risk of Suicide: High Risk  Provider Notified of the C-SSRS Screening and   Risk Assessment Findings: yes.    Order for Constant Observer Continued: no.    If no, discontinued due to the following reasons:  Patient is on 15 minute safety checks yes.  Safety Features on the unit: yes.    No previous safety concerns while on unit: yes.  Patient reporting no thoughts of self-harm while on unit: yes.        Addictive Behavior:   Addictive Behavior  In the Past 3 Months, Have You Felt or Has Someone Told You That You Have a Problem With  : None    Mental Status EXAM:  Mental Status and Behavioral Exam  Normal: No  Level of Assistance: Independent/Self  Facial Expression: Avoids Gaze, Flat, Sad, 
Behavioral Health   Discharge Note  Bridge Appointment completed: Reviewed Discharge Instructions with patient.    Patient verbalizes understanding and agreement with the discharge plan using the teachback method.     Referral for Outpatient Tobacco Cessation Counseling, upon discharge (neeta X if applicable and completed):    ( )  Hospital staff assisted patient to call Quit Line or faxed referral                                   during hospitalization                  ( )  Recognizing danger situations (included triggers and roadblocks), if not completed on admission                    ( )  Coping skills (new ways to manage stress, exercise, relaxation techniques, changing routine, distraction), if not completed on admission                                                           ( )  Basic information about quitting (benefits of quitting, techniques in how to quit, available resources, if not completed on admission  ( ) Ref[facility] for [facility]   (x ) Patient refused referral  ( x) Patient refused counseling  ( x) Patient refused smoking cessation medication upon discharge  ( ) Patient non-tobacco use    Vaccinations (neeta X if applicable and completed):  ( ) Patient states already received influenza vaccine elsewhere  ( ) Patient received influenza vaccine during this hospitalization  ( x) Patient refused influenza vaccine at this time      Pt discharged with followings belongings:  Dental Appliances: None  Vision - Corrective Lenses: None  Hearing Aid: None  Jewelry: None  Body Piercings Removed: N/A  Clothing: Footwear, Undergarments, Pants, Sweater  Other Valuables: Wallet, Credit/Debit Card, Other (Comment), Keys (social security card.)   Valuables sent home with patient.   Valuables retrieved from safe and returned to patient.    Patient left department with staff, escorted to public transportation   , discharged to home  .   Patient education on aftercare instructions: 
Discharge Note     Patient is discharging on this date. Patient denies SI, HI, and AVH at this time. Patient reports improvement in behavior and is leaving unit in overall good condition. SW and patient discussed patient's follow up appointments and importance of attending appointments as scheduled, patient voiced understanding and agreement. Patient and SW also discussed patient's safety plan and patient was able to verbally identify: warning signs, coping strategies, places and people that help make the patient feel better/distract negative thoughts, friends/family/agencies/professionals the patient can reach out to in a crisis, and something that is important to the patient/worth living for. Patient was provided the national suicide prevention hotline number (1-136.263.4078) as well as local community behavioral health ([facility]) crisis number for emergencies (3-506-220-2394).     Discharge Disposition: home -lives with family      Pt to follow up with:  [facility] on January 17 , 2025 at 10:00 AM for the intake appointment. Patient will follow up with [facility] SALVADOR Blank and MANUEL Gonzales  On January 21 , 2025  at 4:00 PM for the medication management appointment.     Referral to outpatient tobacco cessation counseling treatment:  Patient refused referral to outpatient tobacco cessation counseling    SW offered to assist patient with transportation, patient declined transportation assistance  
[facility] Adult Unit Daily Assessment  Nursing Progress Note    Room: [facility]-   Name: Karen Duque   Age: 34 y.o.   Gender: female   Dx: Depression with suicidal ideation  Precautions: close watch and suicide risk  Inpatient Status: voluntary     SLEEP:  Sleep Quality Good  Sleep Medications: Yes   PRN Sleep Meds: Yes     MEDICAL:  Other PRN Meds: No   Med Compliant: Yes  Accu-Chek: No  Oxygen/CPAP/BiPAP: No  CIWA/CINA: No   PAIN Assessment: none  Side Effects from medication: No    Metabolic Screening:  No results found for: \"LABA1C\"  No results found for: \"CHOL\"  No results found for: \"TRIG\"  No results found for: \"HDL\"  No components found for: \"LDLCAL\"  No components found for: \"LABVLDL\"  Body mass index is 39.6 kg/m².  BP Readings from Last 2 Encounters:   01/12/25 92/64   12/29/24 100/65       Medical Bed:   Is patient in a medical bed? no   If medical bed is in use, has nursing secured room while patient is awake and out of the room? NA  Has safety checks by nursing been completed on the bed/room this shift? yes    Protective Factors:  Patient identifies protective factors with nursing staff as follows:   Identifies reasons for living: No   Supportive Social Network or family: No    Belief that suicide is immoral/high spirituality: No   Responsibility to family or others/living with family: No   Fear of death or dying due to pain and suffering: No   Engaged in work or school: No  If Patient is unable to identify, reason why?     Depression: high   Anxiety: high   SI denies suicidal ideation   Risk of Suicide: No Risk  HI Negative for homicidal ideation        AVH:no If Hallucinations are present, describe?     Appetite: good   Percent Meals: 75%   Social: No   Speech: normal   Appearance: appropriately dressed and healthy looking    GROUP:  Group Participation: No  Participation Quality: None    Notes:   ALOx4, sleeping in room all day, up for meals with encouragement.  Appetite good, med compliant. Not 
[facility] Adult Unit Daily Assessment  Nursing Progress Note    Room: [facility]/605-01   Name: Karen Duque   Age: 34 y.o.   Gender: female   Dx: Depression with suicidal ideation  Precautions: suicide risk  Inpatient Status: voluntary     SLEEP:  Sleep Quality Good  Sleep Medications: Yes melatonin 5 mg /trazodone 50 mg   PRN Sleep Meds: No     MEDICAL:  Other PRN Meds: Yes atarax 25 mg   Med Compliant: Yes  Accu-Chek: No  Oxygen/CPAP/BiPAP: No  CIWA/CINA: No   PAIN Assessment: present - adequately treated  Side Effects from medication: No    Metabolic Screening:  Lab Results   Component Value Date    LABA1C 6.0 (H) 01/11/2025     No results found for: \"CHOL\"  No results found for: \"TRIG\"  No results found for: \"HDL\"  No components found for: \"LDLCAL\"  No components found for: \"LABVLDL\"  Body mass index is 39.6 kg/m².  BP Readings from Last 2 Encounters:   01/12/25 92/69   12/29/24 100/65       Medical Bed:   Is patient in a medical bed? no   If medical bed is in use, has nursing secured room while patient is awake and out of the room? NA  Has safety checks by nursing been completed on the bed/room this shift? yes    Protective Factors:  Patient identifies protective factors with nursing staff as follows:   Identifies reasons for living: Yes   Supportive Social Network or family: Yes    Belief that suicide is immoral/high spirituality: Yes   Responsibility to family or others/living with family: Yes   Fear of death or dying due to pain and suffering: Yes   Engaged in work or school: No  If Patient is unable to identify, reason why?     Depression: did not rate    Anxiety: did not rate   SI denies suicidal ideation   Risk of Suicide: No Risk  HI Negative for homicidal ideation        AVH:no If Hallucinations are present, describe?     Appetite: decreased   Percent Meals: 75%   Social: No   Speech: normal   Appearance: appropriately dressed, disheveled, and healthy looking    GROUP:  Group Participation: No  Participation 
Patient was provided with a transportation voucher through the patient mission funds.     SW provided nurse in charge of patient care with voucher at this time.   
Progress Note  Karen Duque  1/13/2025 5:51 PM  Subjective:   Admit Date:   1/11/2025      CC/ADMIT DX:       Interval History:   Reviewed overnight events and nursing notes. She has had no new medical issues.     I have reviewed all labs/diagnostics from the last 24hrs.       ROS:   I have done a 10 point ROS and all are negative, except what is mentioned in the HPI.    No diet orders on file    Medications:             Objective:   Vitals: BP 95/79   Pulse 85   Temp 97.7 °F (36.5 °C) (Temporal)   Resp 14   Wt 83 kg (183 lb)   LMP 12/21/2024   SpO2 99%   BMI 39.60 kg/m²  No intake or output data in the 24 hours ending 01/13/25 1751  General appearance: alert and cooperative with exam  Lungs: clear to auscultation bilaterally  Heart: regular rate and rhythm, S1, S2 normal, no murmur, click, rub or gallop  Abdomen: soft, non-tender; bowel sounds normal; no masses,  no organomegaly  Extremities: extremities normal, atraumatic, no cyanosis or edema  Neurologic:  No obvious focal neurologic deficits.    Assessment and Plan:   Principal Problem:    Depression with suicidal ideation  Active Problems:    Unspecified mood (affective) disorder (HCC)  Resolved Problems:    * No resolved hospital problems. *    Elevated BP    Plan:   Continue present medication(s)    Follow with Psych   Follow with BP      Discharge planning:   her home     Reviewed treatment plans with the patient and/or family.             Electronically signed by Yareli Rivera MD on 1/13/2025 at 5:51 PM  
SW spoke in regards to safe discharge planning. Patient was verbally agreeable to obtaining follow up appointments at [facility]. Patient reports that she will be returning home alone and will need transportation assistance. Denies needing additional information/resources as well as a work excuse at this time.   
SW was unable to met with patient to complete psychosocial, lifetime CSSR-S and admission OQ-30 Questionnaire on this date due to patient discharging within the 72 hour period.   
fidgety   Speech:  normal pitch and normal volume   Mood:  depressed and sad   Affect:  inappropriate, mood-congruent, and redirectable   Thought Process:  circumstantial   Thought Content:  suicidal   Sensorium:  person, place, time/date, situation, day of week, month of year, and year   Cognition:  grossly intact   Insight:  limited         Psychiatric Review Of Systems:     Recent Sleep changes: States she has not been able sleep.   Recent appetite changes: no  Recent weight changes/Pounds gained (+) or lost (-): no     Current living arrangement:alone  Current Support System:denies having one  Employment:Has an interview \"on Monday\", she states.     Psychiatric Hospitalizations: Yes   Where & When: This facility 12/2024.  [place] as a [facility].  Outpatient Psychiatric Treatment: no    Family History:    Family history of mental illness: no   \"Depression\",\"Anxiety\",\"Bipolar\",\"Schizophrenia\",\"Borderline\",\"ADHD\"}  Family members with suicide attempt: no   If yes explain (attempted or completed):    Substance Abuse History:     SBIRT Completed:   Brief Intervention completed if needed:  (Yes/No)    Current ETOH LEVELS: <10    ETOH Usage:     Amount drinking daily: admits to drinking last night.    Date of last drink: 1/10/25 pm  Longest period of sobriety:    Substance/Chemical Abuse/Recreational Drug History:  Substance used: patient was unsure, admits she was provided with both illegal and prescription substances 1/10/25 PM in addition to drinking alcohol. She reports this is the first time she has used drugs.   Date of last substance use: 1/10/25 pm  Tobacco Use: no   History of rehab treatment:  How many times in rehab:  Last time in rehab:  Family history of substance abuse:    Opiates: It was discussed with pt they would not be receiving opiates unless they were within 3 days post surgery/acute injury. Patient voiced understanding and agreed.      Medical History:     Medical Diagnosis/Issues: Depression,

## 2025-01-14 ENCOUNTER — FOLLOWUP TELEPHONE ENCOUNTER (OUTPATIENT)
Dept: PSYCHIATRY | Age: 35
End: 2025-01-14

## 2025-01-14 NOTE — TELEPHONE ENCOUNTER
SW attempted to follow up with pt after she was discharged from the unit yesterday and she said she was doing well. Pt denied having any questions or concerns at this time.